# Patient Record
Sex: MALE | Race: WHITE | HISPANIC OR LATINO | URBAN - METROPOLITAN AREA
[De-identification: names, ages, dates, MRNs, and addresses within clinical notes are randomized per-mention and may not be internally consistent; named-entity substitution may affect disease eponyms.]

---

## 2017-02-06 ENCOUNTER — EMERGENCY (EMERGENCY)
Facility: HOSPITAL | Age: 42
LOS: 0 days | Discharge: HOME | End: 2017-02-06
Attending: EMERGENCY MEDICINE | Admitting: EMERGENCY MEDICINE
Payer: MEDICAID

## 2017-02-06 VITALS
DIASTOLIC BLOOD PRESSURE: 80 MMHG | SYSTOLIC BLOOD PRESSURE: 150 MMHG | TEMPERATURE: 97 F | RESPIRATION RATE: 16 BRPM | WEIGHT: 179.9 LBS | OXYGEN SATURATION: 100 % | HEART RATE: 76 BPM

## 2017-02-06 VITALS
TEMPERATURE: 98 F | SYSTOLIC BLOOD PRESSURE: 129 MMHG | HEART RATE: 84 BPM | RESPIRATION RATE: 16 BRPM | OXYGEN SATURATION: 98 % | DIASTOLIC BLOOD PRESSURE: 84 MMHG

## 2017-02-06 DIAGNOSIS — R11.0 NAUSEA: ICD-10-CM

## 2017-02-06 DIAGNOSIS — R10.13 EPIGASTRIC PAIN: ICD-10-CM

## 2017-02-06 LAB
ALBUMIN SERPL ELPH-MCNC: 3.8 G/DL — SIGNIFICANT CHANGE UP (ref 3.3–5)
ALP SERPL-CCNC: 49 U/L — SIGNIFICANT CHANGE UP (ref 40–120)
ALT FLD-CCNC: 63 U/L — SIGNIFICANT CHANGE UP (ref 12–78)
ANION GAP SERPL CALC-SCNC: 10 MMOL/L — SIGNIFICANT CHANGE UP (ref 5–17)
AST SERPL-CCNC: 40 U/L — HIGH (ref 15–37)
BASOPHILS # BLD AUTO: 0.1 K/UL — SIGNIFICANT CHANGE UP (ref 0–0.2)
BASOPHILS NFR BLD AUTO: 1.2 % — SIGNIFICANT CHANGE UP (ref 0–2)
BILIRUB SERPL-MCNC: 0.4 MG/DL — SIGNIFICANT CHANGE UP (ref 0.2–1.2)
BUN SERPL-MCNC: 13 MG/DL — SIGNIFICANT CHANGE UP (ref 7–23)
CALCIUM SERPL-MCNC: 8.2 MG/DL — LOW (ref 8.5–10.1)
CHLORIDE SERPL-SCNC: 102 MMOL/L — SIGNIFICANT CHANGE UP (ref 96–108)
CO2 SERPL-SCNC: 27 MMOL/L — SIGNIFICANT CHANGE UP (ref 22–31)
CREAT SERPL-MCNC: 0.9 MG/DL — SIGNIFICANT CHANGE UP (ref 0.5–1.3)
EOSINOPHIL # BLD AUTO: 0.3 K/UL — SIGNIFICANT CHANGE UP (ref 0–0.5)
EOSINOPHIL NFR BLD AUTO: 2.4 % — SIGNIFICANT CHANGE UP (ref 0–6)
GLUCOSE SERPL-MCNC: 156 MG/DL — HIGH (ref 70–99)
HCT VFR BLD CALC: 41.4 % — SIGNIFICANT CHANGE UP (ref 39–50)
HGB BLD-MCNC: 13.8 G/DL — SIGNIFICANT CHANGE UP (ref 13–17)
LIDOCAIN IGE QN: 172 U/L — SIGNIFICANT CHANGE UP (ref 73–393)
LYMPHOCYTES # BLD AUTO: 39.8 % — SIGNIFICANT CHANGE UP (ref 13–44)
LYMPHOCYTES # BLD AUTO: 4.9 K/UL — HIGH (ref 1–3.3)
MCHC RBC-ENTMCNC: 29 PG — SIGNIFICANT CHANGE UP (ref 27–34)
MCHC RBC-ENTMCNC: 33.3 GM/DL — SIGNIFICANT CHANGE UP (ref 32–36)
MCV RBC AUTO: 87.3 FL — SIGNIFICANT CHANGE UP (ref 80–100)
MONOCYTES # BLD AUTO: 1 K/UL — HIGH (ref 0–0.9)
MONOCYTES NFR BLD AUTO: 8.3 % — SIGNIFICANT CHANGE UP (ref 2–14)
NEUTROPHILS # BLD AUTO: 6 K/UL — SIGNIFICANT CHANGE UP (ref 1.8–7.4)
NEUTROPHILS NFR BLD AUTO: 48.3 % — SIGNIFICANT CHANGE UP (ref 43–77)
PLATELET # BLD AUTO: 280 K/UL — SIGNIFICANT CHANGE UP (ref 150–400)
POTASSIUM SERPL-MCNC: 3.6 MMOL/L — SIGNIFICANT CHANGE UP (ref 3.5–5.3)
POTASSIUM SERPL-SCNC: 3.6 MMOL/L — SIGNIFICANT CHANGE UP (ref 3.5–5.3)
PROT SERPL-MCNC: 7.5 GM/DL — SIGNIFICANT CHANGE UP (ref 6–8.3)
RBC # BLD: 4.74 M/UL — SIGNIFICANT CHANGE UP (ref 4.2–5.8)
RBC # FLD: 11.8 % — SIGNIFICANT CHANGE UP (ref 10.3–14.5)
SODIUM SERPL-SCNC: 139 MMOL/L — SIGNIFICANT CHANGE UP (ref 135–145)
WBC # BLD: 12.4 K/UL — HIGH (ref 3.8–10.5)
WBC # FLD AUTO: 12.4 K/UL — HIGH (ref 3.8–10.5)

## 2017-02-06 PROCEDURE — 99284 EMERGENCY DEPT VISIT MOD MDM: CPT | Mod: 25

## 2017-02-06 PROCEDURE — 93010 ELECTROCARDIOGRAM REPORT: CPT

## 2017-02-06 RX ORDER — FAMOTIDINE 10 MG/ML
20 INJECTION INTRAVENOUS ONCE
Qty: 0 | Refills: 0 | Status: DISCONTINUED | OUTPATIENT
Start: 2017-02-06 | End: 2017-02-06

## 2017-02-06 RX ORDER — SODIUM CHLORIDE 9 MG/ML
3 INJECTION INTRAMUSCULAR; INTRAVENOUS; SUBCUTANEOUS ONCE
Qty: 0 | Refills: 0 | Status: DISCONTINUED | OUTPATIENT
Start: 2017-02-06 | End: 2017-02-06

## 2017-02-06 RX ORDER — ONDANSETRON 8 MG/1
4 TABLET, FILM COATED ORAL ONCE
Qty: 0 | Refills: 0 | Status: COMPLETED | OUTPATIENT
Start: 2017-02-06 | End: 2017-02-06

## 2017-02-06 RX ORDER — FAMOTIDINE 10 MG/ML
20 INJECTION INTRAVENOUS ONCE
Qty: 0 | Refills: 0 | Status: COMPLETED | OUTPATIENT
Start: 2017-02-06 | End: 2017-02-06

## 2017-02-06 RX ORDER — SODIUM CHLORIDE 9 MG/ML
1000 INJECTION INTRAMUSCULAR; INTRAVENOUS; SUBCUTANEOUS ONCE
Qty: 0 | Refills: 0 | Status: DISCONTINUED | OUTPATIENT
Start: 2017-02-06 | End: 2017-02-06

## 2017-02-06 RX ORDER — ONDANSETRON 8 MG/1
4 TABLET, FILM COATED ORAL ONCE
Qty: 0 | Refills: 0 | Status: DISCONTINUED | OUTPATIENT
Start: 2017-02-06 | End: 2017-02-06

## 2017-02-06 RX ADMIN — ONDANSETRON 4 MILLIGRAM(S): 8 TABLET, FILM COATED ORAL at 07:46

## 2017-02-06 RX ADMIN — FAMOTIDINE 20 MILLIGRAM(S): 10 INJECTION INTRAVENOUS at 07:47

## 2017-02-06 NOTE — ED PROVIDER NOTE - OBJECTIVE STATEMENT
Pt. is a 41 year old male no significant medical history presents with epigastric pain x 5 hours.  Denies V/D but states nausea.  Denies recent ETOH use.  Neg. F/C/S.  Neg. medications take PTA.

## 2017-02-06 NOTE — ED PROVIDER NOTE - ATTENDING CONTRIBUTION TO CARE
42 yo male c/o few hours of upper abdominal pain s/p eating spicy food. +nausea. no vomiting or diarrhea.   no chest pain or shortness of breath  agree with above

## 2017-02-06 NOTE — ED ADULT TRIAGE NOTE - CHIEF COMPLAINT QUOTE
Abdominal pain, nausea Abdominal pain, nausea x 3 hours Abdominal pain, nausea x 3 hours, states it began after eating spicy food

## 2017-04-07 ENCOUNTER — EMERGENCY (EMERGENCY)
Facility: HOSPITAL | Age: 42
LOS: 0 days | Discharge: HOME | End: 2017-04-07
Attending: EMERGENCY MEDICINE | Admitting: EMERGENCY MEDICINE
Payer: MEDICAID

## 2017-04-07 VITALS
TEMPERATURE: 98 F | WEIGHT: 179.9 LBS | HEART RATE: 62 BPM | SYSTOLIC BLOOD PRESSURE: 151 MMHG | OXYGEN SATURATION: 100 % | RESPIRATION RATE: 18 BRPM | HEIGHT: 68 IN | DIASTOLIC BLOOD PRESSURE: 98 MMHG

## 2017-04-07 VITALS — HEART RATE: 69 BPM | RESPIRATION RATE: 18 BRPM | DIASTOLIC BLOOD PRESSURE: 85 MMHG | SYSTOLIC BLOOD PRESSURE: 127 MMHG

## 2017-04-07 DIAGNOSIS — K80.20 CALCULUS OF GALLBLADDER WITHOUT CHOLECYSTITIS WITHOUT OBSTRUCTION: ICD-10-CM

## 2017-04-07 DIAGNOSIS — R10.11 RIGHT UPPER QUADRANT PAIN: ICD-10-CM

## 2017-04-07 LAB
ALBUMIN SERPL ELPH-MCNC: 3.9 G/DL — SIGNIFICANT CHANGE UP (ref 3.3–5)
ALP SERPL-CCNC: 59 U/L — SIGNIFICANT CHANGE UP (ref 40–120)
ALT FLD-CCNC: 83 U/L — HIGH (ref 12–78)
ANION GAP SERPL CALC-SCNC: 11 MMOL/L — SIGNIFICANT CHANGE UP (ref 5–17)
APPEARANCE UR: CLEAR — SIGNIFICANT CHANGE UP
AST SERPL-CCNC: 41 U/L — HIGH (ref 15–37)
BACTERIA # UR AUTO: NEGATIVE — SIGNIFICANT CHANGE UP
BASOPHILS # BLD AUTO: 0.2 K/UL — SIGNIFICANT CHANGE UP (ref 0–0.2)
BASOPHILS NFR BLD AUTO: 1 % — SIGNIFICANT CHANGE UP (ref 0–2)
BILIRUB SERPL-MCNC: 0.4 MG/DL — SIGNIFICANT CHANGE UP (ref 0.2–1.2)
BILIRUB UR-MCNC: NEGATIVE — SIGNIFICANT CHANGE UP
BUN SERPL-MCNC: 16 MG/DL — SIGNIFICANT CHANGE UP (ref 7–23)
CALCIUM SERPL-MCNC: 8.9 MG/DL — SIGNIFICANT CHANGE UP (ref 8.5–10.1)
CHLORIDE SERPL-SCNC: 104 MMOL/L — SIGNIFICANT CHANGE UP (ref 96–108)
CO2 SERPL-SCNC: 24 MMOL/L — SIGNIFICANT CHANGE UP (ref 22–31)
COLOR SPEC: YELLOW — SIGNIFICANT CHANGE UP
CREAT SERPL-MCNC: 0.88 MG/DL — SIGNIFICANT CHANGE UP (ref 0.5–1.3)
DIFF PNL FLD: NEGATIVE — SIGNIFICANT CHANGE UP
EOSINOPHIL # BLD AUTO: 0.3 K/UL — SIGNIFICANT CHANGE UP (ref 0–0.5)
EOSINOPHIL NFR BLD AUTO: 1.6 % — SIGNIFICANT CHANGE UP (ref 0–6)
EPI CELLS # UR: NEGATIVE — SIGNIFICANT CHANGE UP
GLUCOSE SERPL-MCNC: 180 MG/DL — HIGH (ref 70–99)
GLUCOSE UR QL: 50 MG/DL
HCT VFR BLD CALC: 45.4 % — SIGNIFICANT CHANGE UP (ref 39–50)
HGB BLD-MCNC: 15.4 G/DL — SIGNIFICANT CHANGE UP (ref 13–17)
KETONES UR-MCNC: NEGATIVE — SIGNIFICANT CHANGE UP
LEUKOCYTE ESTERASE UR-ACNC: NEGATIVE — SIGNIFICANT CHANGE UP
LIDOCAIN IGE QN: 220 U/L — SIGNIFICANT CHANGE UP (ref 73–393)
LYMPHOCYTES # BLD AUTO: 27.9 % — SIGNIFICANT CHANGE UP (ref 13–44)
LYMPHOCYTES # BLD AUTO: 4.9 K/UL — HIGH (ref 1–3.3)
MCHC RBC-ENTMCNC: 30 PG — SIGNIFICANT CHANGE UP (ref 27–34)
MCHC RBC-ENTMCNC: 34 GM/DL — SIGNIFICANT CHANGE UP (ref 32–36)
MCV RBC AUTO: 88.2 FL — SIGNIFICANT CHANGE UP (ref 80–100)
MONOCYTES # BLD AUTO: 1.3 K/UL — HIGH (ref 0–0.9)
MONOCYTES NFR BLD AUTO: 7.5 % — SIGNIFICANT CHANGE UP (ref 2–14)
NEUTROPHILS # BLD AUTO: 10.8 K/UL — HIGH (ref 1.8–7.4)
NEUTROPHILS NFR BLD AUTO: 62 % — SIGNIFICANT CHANGE UP (ref 43–77)
NITRITE UR-MCNC: NEGATIVE — SIGNIFICANT CHANGE UP
PH UR: 6 — SIGNIFICANT CHANGE UP (ref 4.8–8)
PLATELET # BLD AUTO: 283 K/UL — SIGNIFICANT CHANGE UP (ref 150–400)
POTASSIUM SERPL-MCNC: 3.6 MMOL/L — SIGNIFICANT CHANGE UP (ref 3.5–5.3)
POTASSIUM SERPL-SCNC: 3.6 MMOL/L — SIGNIFICANT CHANGE UP (ref 3.5–5.3)
PROT SERPL-MCNC: 7.7 GM/DL — SIGNIFICANT CHANGE UP (ref 6–8.3)
PROT UR-MCNC: 15 MG/DL
RBC # BLD: 5.15 M/UL — SIGNIFICANT CHANGE UP (ref 4.2–5.8)
RBC # FLD: 12.7 % — SIGNIFICANT CHANGE UP (ref 10.3–14.5)
RBC CASTS # UR COMP ASSIST: NEGATIVE /HPF — SIGNIFICANT CHANGE UP (ref 0–4)
SODIUM SERPL-SCNC: 139 MMOL/L — SIGNIFICANT CHANGE UP (ref 135–145)
SP GR SPEC: 1.02 — SIGNIFICANT CHANGE UP (ref 1.01–1.02)
UROBILINOGEN FLD QL: NEGATIVE MG/DL — SIGNIFICANT CHANGE UP
WBC # BLD: 17.5 K/UL — HIGH (ref 3.8–10.5)
WBC # FLD AUTO: 17.5 K/UL — HIGH (ref 3.8–10.5)
WBC UR QL: SIGNIFICANT CHANGE UP

## 2017-04-07 PROCEDURE — 99284 EMERGENCY DEPT VISIT MOD MDM: CPT | Mod: 25

## 2017-04-07 PROCEDURE — 76705 ECHO EXAM OF ABDOMEN: CPT | Mod: 26

## 2017-04-07 PROCEDURE — 78226 HEPATOBILIARY SYSTEM IMAGING: CPT | Mod: 26

## 2017-04-07 RX ORDER — SODIUM CHLORIDE 9 MG/ML
1000 INJECTION INTRAMUSCULAR; INTRAVENOUS; SUBCUTANEOUS ONCE
Qty: 0 | Refills: 0 | Status: COMPLETED | OUTPATIENT
Start: 2017-04-07 | End: 2017-04-07

## 2017-04-07 RX ORDER — ONDANSETRON 8 MG/1
4 TABLET, FILM COATED ORAL ONCE
Qty: 0 | Refills: 0 | Status: COMPLETED | OUTPATIENT
Start: 2017-04-07 | End: 2017-04-07

## 2017-04-07 RX ORDER — MORPHINE SULFATE 50 MG/1
4 CAPSULE, EXTENDED RELEASE ORAL ONCE
Qty: 0 | Refills: 0 | Status: DISCONTINUED | OUTPATIENT
Start: 2017-04-07 | End: 2017-04-07

## 2017-04-07 RX ORDER — PIPERACILLIN AND TAZOBACTAM 4; .5 G/20ML; G/20ML
3.38 INJECTION, POWDER, LYOPHILIZED, FOR SOLUTION INTRAVENOUS ONCE
Qty: 0 | Refills: 0 | Status: COMPLETED | OUTPATIENT
Start: 2017-04-07 | End: 2017-04-07

## 2017-04-07 RX ORDER — TRAMADOL HYDROCHLORIDE 50 MG/1
1 TABLET ORAL
Qty: 16 | Refills: 0 | OUTPATIENT
Start: 2017-04-07 | End: 2017-04-11

## 2017-04-07 RX ADMIN — ONDANSETRON 4 MILLIGRAM(S): 8 TABLET, FILM COATED ORAL at 05:15

## 2017-04-07 RX ADMIN — MORPHINE SULFATE 4 MILLIGRAM(S): 50 CAPSULE, EXTENDED RELEASE ORAL at 05:14

## 2017-04-07 RX ADMIN — PIPERACILLIN AND TAZOBACTAM 200 GRAM(S): 4; .5 INJECTION, POWDER, LYOPHILIZED, FOR SOLUTION INTRAVENOUS at 07:27

## 2017-04-07 RX ADMIN — SODIUM CHLORIDE 1000 MILLILITER(S): 9 INJECTION INTRAMUSCULAR; INTRAVENOUS; SUBCUTANEOUS at 04:12

## 2017-04-07 NOTE — ED PROVIDER NOTE - NS ED MD SCRIBE ATTENDING SCRIBE SECTIONS
PROGRESS NOTE/RESULTS/OBSERVATION MONITORING PLAN/SCALES/PHYSICAL EXAM/DISPOSITION/INTAKE ASSESSMENT/SCREENINGS/VITAL SIGNS( Pullset)/PROVIDER CARE INITIATION/MANDATORY DOCUMENTATION/HIV/HISTORY OF PRESENT ILLNESS/CONSULTATIONS/SHIFT CHANGE/REVIEW OF SYSTEMS/PAST MEDICAL/SURGICAL/SOCIAL HISTORY

## 2017-04-07 NOTE — ED ADULT NURSE NOTE - OBJECTIVE STATEMENT
42 y/o male awake alert and oriented x4 presents to ED c/o RUQ pain started at 12:30am tonight associated with nausea and vomiting. pt vomited x2 times pta. pt was seen in the ED for similar sx. Denies cp,sob,ha,dz,fever/chills or urinary sx.

## 2017-04-07 NOTE — ED PROVIDER NOTE - OBJECTIVE STATEMENT
40 y/o male presents to ED with c/o abdominal pain. 40 y/o male presents to ED with c/o sudden abdominal pain began 4 hours ago while asleep.  Denies gallbladder hx. Denies surgical hx. Dr. John is PMD.

## 2017-04-07 NOTE — ED ADULT NURSE REASSESSMENT NOTE - NS ED NURSE REASSESS COMMENT FT1
received pt from Edwige RN, pt lying in bed comfortable, pt states 0 out of 10 on pain scale, vss, IV antibiotics running, pt needs and comfort care met

## 2017-04-07 NOTE — ED PROVIDER NOTE - DETAILS:
I, Aleksandar Hope, performed the initial face to face bedside interview with this patient regarding history of present illness, review of symptoms and relevant past medical, social and family history.  I completed an independent physical examination.  I was the initial provider who evaluated this patient.  The history, relevant review of systems, past medical and surgical history, medical decision making, and physical examination was documented by the scribe in my presence and I attest to the accuracy of the documentation.

## 2017-06-12 ENCOUNTER — OUTPATIENT (OUTPATIENT)
Dept: OUTPATIENT SERVICES | Facility: HOSPITAL | Age: 42
LOS: 1 days | End: 2017-06-12
Payer: SELF-PAY

## 2017-06-12 ENCOUNTER — APPOINTMENT (OUTPATIENT)
Dept: SURGERY | Facility: HOSPITAL | Age: 42
End: 2017-06-12

## 2017-06-12 VITALS
BODY MASS INDEX: 30.29 KG/M2 | SYSTOLIC BLOOD PRESSURE: 141 MMHG | RESPIRATION RATE: 14 BRPM | HEIGHT: 65.5 IN | HEART RATE: 64 BPM | WEIGHT: 184 LBS | DIASTOLIC BLOOD PRESSURE: 96 MMHG

## 2017-06-12 DIAGNOSIS — L72.3 SEBACEOUS CYST: ICD-10-CM

## 2017-06-12 DIAGNOSIS — R10.13 EPIGASTRIC PAIN: ICD-10-CM

## 2017-06-12 PROCEDURE — G0463: CPT

## 2017-07-10 ENCOUNTER — APPOINTMENT (OUTPATIENT)
Dept: SURGERY | Facility: HOSPITAL | Age: 42
End: 2017-07-10

## 2017-07-13 ENCOUNTER — OUTPATIENT (OUTPATIENT)
Dept: OUTPATIENT SERVICES | Facility: HOSPITAL | Age: 42
LOS: 1 days | Discharge: HOME | End: 2017-07-13
Payer: SUBSIDIZED

## 2017-07-13 ENCOUNTER — RESULT REVIEW (OUTPATIENT)
Age: 42
End: 2017-07-13

## 2017-07-13 VITALS
SYSTOLIC BLOOD PRESSURE: 156 MMHG | HEIGHT: 66 IN | TEMPERATURE: 97 F | HEART RATE: 75 BPM | WEIGHT: 179.9 LBS | DIASTOLIC BLOOD PRESSURE: 92 MMHG | OXYGEN SATURATION: 98 % | RESPIRATION RATE: 19 BRPM

## 2017-07-13 PROCEDURE — 88312 SPECIAL STAINS GROUP 1: CPT | Mod: 26

## 2017-07-13 PROCEDURE — 88342 IMHCHEM/IMCYTCHM 1ST ANTB: CPT | Mod: 26

## 2017-07-13 PROCEDURE — 88305 TISSUE EXAM BY PATHOLOGIST: CPT | Mod: 26

## 2017-07-13 NOTE — ASU PATIENT PROFILE, ADULT - LANGUAGE ASSISTANCE NEEDED
Yes-Patient/Caregiver accepts free interpretation services... No-Patient/Caregiver offered and refused free interpretation services.

## 2017-07-18 LAB — SURGICAL PATHOLOGY FINAL REPORT - CH: SIGNIFICANT CHANGE UP

## 2017-07-19 DIAGNOSIS — I10 ESSENTIAL (PRIMARY) HYPERTENSION: ICD-10-CM

## 2017-07-19 DIAGNOSIS — R10.13 EPIGASTRIC PAIN: ICD-10-CM

## 2017-07-19 DIAGNOSIS — K29.50 UNSPECIFIED CHRONIC GASTRITIS WITHOUT BLEEDING: ICD-10-CM

## 2017-08-17 ENCOUNTER — EMERGENCY (EMERGENCY)
Facility: HOSPITAL | Age: 42
LOS: 0 days | Discharge: HOME | End: 2017-08-17
Attending: EMERGENCY MEDICINE | Admitting: EMERGENCY MEDICINE
Payer: MEDICAID

## 2017-08-17 VITALS
WEIGHT: 179.9 LBS | DIASTOLIC BLOOD PRESSURE: 111 MMHG | HEIGHT: 66 IN | OXYGEN SATURATION: 100 % | RESPIRATION RATE: 18 BRPM | TEMPERATURE: 98 F | SYSTOLIC BLOOD PRESSURE: 176 MMHG | HEART RATE: 77 BPM

## 2017-08-17 VITALS
OXYGEN SATURATION: 99 % | HEART RATE: 78 BPM | DIASTOLIC BLOOD PRESSURE: 78 MMHG | SYSTOLIC BLOOD PRESSURE: 123 MMHG | RESPIRATION RATE: 18 BRPM | TEMPERATURE: 98 F

## 2017-08-17 DIAGNOSIS — R10.13 EPIGASTRIC PAIN: ICD-10-CM

## 2017-08-17 LAB
ALBUMIN SERPL ELPH-MCNC: 4 G/DL — SIGNIFICANT CHANGE UP (ref 3.3–5)
ALP SERPL-CCNC: 58 U/L — SIGNIFICANT CHANGE UP (ref 40–120)
ALT FLD-CCNC: 30 U/L — SIGNIFICANT CHANGE UP (ref 12–78)
ANION GAP SERPL CALC-SCNC: 8 MMOL/L — SIGNIFICANT CHANGE UP (ref 5–17)
AST SERPL-CCNC: 21 U/L — SIGNIFICANT CHANGE UP (ref 15–37)
BASOPHILS # BLD AUTO: 0.1 K/UL — SIGNIFICANT CHANGE UP (ref 0–0.2)
BASOPHILS NFR BLD AUTO: 0.9 % — SIGNIFICANT CHANGE UP (ref 0–2)
BILIRUB SERPL-MCNC: 0.3 MG/DL — SIGNIFICANT CHANGE UP (ref 0.2–1.2)
BUN SERPL-MCNC: 15 MG/DL — SIGNIFICANT CHANGE UP (ref 7–23)
CALCIUM SERPL-MCNC: 8.7 MG/DL — SIGNIFICANT CHANGE UP (ref 8.5–10.1)
CHLORIDE SERPL-SCNC: 105 MMOL/L — SIGNIFICANT CHANGE UP (ref 96–108)
CO2 SERPL-SCNC: 26 MMOL/L — SIGNIFICANT CHANGE UP (ref 22–31)
CREAT SERPL-MCNC: 0.77 MG/DL — SIGNIFICANT CHANGE UP (ref 0.5–1.3)
EOSINOPHIL # BLD AUTO: 0.3 K/UL — SIGNIFICANT CHANGE UP (ref 0–0.5)
EOSINOPHIL NFR BLD AUTO: 2 % — SIGNIFICANT CHANGE UP (ref 0–6)
GLUCOSE SERPL-MCNC: 158 MG/DL — HIGH (ref 70–99)
HCT VFR BLD CALC: 44.3 % — SIGNIFICANT CHANGE UP (ref 39–50)
HGB BLD-MCNC: 15.2 G/DL — SIGNIFICANT CHANGE UP (ref 13–17)
LIDOCAIN IGE QN: 178 U/L — SIGNIFICANT CHANGE UP (ref 73–393)
LYMPHOCYTES # BLD AUTO: 26.6 % — SIGNIFICANT CHANGE UP (ref 13–44)
LYMPHOCYTES # BLD AUTO: 4.3 K/UL — HIGH (ref 1–3.3)
MAGNESIUM SERPL-MCNC: 2 MG/DL — SIGNIFICANT CHANGE UP (ref 1.6–2.6)
MCHC RBC-ENTMCNC: 29.2 PG — SIGNIFICANT CHANGE UP (ref 27–34)
MCHC RBC-ENTMCNC: 34.2 GM/DL — SIGNIFICANT CHANGE UP (ref 32–36)
MCV RBC AUTO: 85.5 FL — SIGNIFICANT CHANGE UP (ref 80–100)
MONOCYTES # BLD AUTO: 1.2 K/UL — HIGH (ref 0–0.9)
MONOCYTES NFR BLD AUTO: 7.4 % — SIGNIFICANT CHANGE UP (ref 2–14)
NEUTROPHILS # BLD AUTO: 10.2 K/UL — HIGH (ref 1.8–7.4)
NEUTROPHILS NFR BLD AUTO: 63.2 % — SIGNIFICANT CHANGE UP (ref 43–77)
PLATELET # BLD AUTO: 224 K/UL — SIGNIFICANT CHANGE UP (ref 150–400)
POTASSIUM SERPL-MCNC: 3.4 MMOL/L — LOW (ref 3.5–5.3)
POTASSIUM SERPL-SCNC: 3.4 MMOL/L — LOW (ref 3.5–5.3)
PROT SERPL-MCNC: 7.8 GM/DL — SIGNIFICANT CHANGE UP (ref 6–8.3)
RBC # BLD: 5.19 M/UL — SIGNIFICANT CHANGE UP (ref 4.2–5.8)
RBC # FLD: 11.9 % — SIGNIFICANT CHANGE UP (ref 10.3–14.5)
SODIUM SERPL-SCNC: 139 MMOL/L — SIGNIFICANT CHANGE UP (ref 135–145)
WBC # BLD: 16.2 K/UL — HIGH (ref 3.8–10.5)
WBC # FLD AUTO: 16.2 K/UL — HIGH (ref 3.8–10.5)

## 2017-08-17 PROCEDURE — 76705 ECHO EXAM OF ABDOMEN: CPT | Mod: 26

## 2017-08-17 PROCEDURE — 93010 ELECTROCARDIOGRAM REPORT: CPT

## 2017-08-17 PROCEDURE — 99285 EMERGENCY DEPT VISIT HI MDM: CPT

## 2017-08-17 RX ORDER — KETOROLAC TROMETHAMINE 30 MG/ML
30 SYRINGE (ML) INJECTION ONCE
Qty: 0 | Refills: 0 | Status: DISCONTINUED | OUTPATIENT
Start: 2017-08-17 | End: 2017-08-17

## 2017-08-17 RX ORDER — FAMOTIDINE 10 MG/ML
20 INJECTION INTRAVENOUS ONCE
Qty: 0 | Refills: 0 | Status: COMPLETED | OUTPATIENT
Start: 2017-08-17 | End: 2017-08-17

## 2017-08-17 RX ORDER — POTASSIUM CHLORIDE 20 MEQ
20 PACKET (EA) ORAL ONCE
Qty: 0 | Refills: 0 | Status: COMPLETED | OUTPATIENT
Start: 2017-08-17 | End: 2017-08-17

## 2017-08-17 RX ORDER — ONDANSETRON 8 MG/1
4 TABLET, FILM COATED ORAL ONCE
Qty: 0 | Refills: 0 | Status: COMPLETED | OUTPATIENT
Start: 2017-08-17 | End: 2017-08-17

## 2017-08-17 RX ORDER — TRAMADOL HYDROCHLORIDE 50 MG/1
1 TABLET ORAL
Qty: 20 | Refills: 0
Start: 2017-08-17

## 2017-08-17 RX ORDER — SODIUM CHLORIDE 9 MG/ML
2000 INJECTION INTRAMUSCULAR; INTRAVENOUS; SUBCUTANEOUS ONCE
Qty: 0 | Refills: 0 | Status: COMPLETED | OUTPATIENT
Start: 2017-08-17 | End: 2017-08-17

## 2017-08-17 RX ADMIN — Medication 30 MILLIGRAM(S): at 02:10

## 2017-08-17 RX ADMIN — Medication 30 MILLIGRAM(S): at 01:53

## 2017-08-17 RX ADMIN — ONDANSETRON 4 MILLIGRAM(S): 8 TABLET, FILM COATED ORAL at 01:30

## 2017-08-17 RX ADMIN — Medication 20 MILLIEQUIVALENT(S): at 02:13

## 2017-08-17 RX ADMIN — FAMOTIDINE 20 MILLIGRAM(S): 10 INJECTION INTRAVENOUS at 01:30

## 2017-08-17 RX ADMIN — SODIUM CHLORIDE 2000 MILLILITER(S): 9 INJECTION INTRAMUSCULAR; INTRAVENOUS; SUBCUTANEOUS at 01:10

## 2017-08-17 NOTE — ED PROVIDER NOTE - MEDICAL DECISION MAKING DETAILS
43 yo male with recurrent episodes of n/v and epigastric pain, will check labs, hydrate and pain control

## 2017-08-17 NOTE — ED ADULT NURSE NOTE - OBJECTIVE STATEMENT
Pt states he has had RUQ ABD pain since approx 7pm today with associated N/V.  Pt states MD told him he has "stones in gallbladder".

## 2017-08-17 NOTE — ED ADULT NURSE REASSESSMENT NOTE - NS ED NURSE REASSESS COMMENT FT1
Pt in stretcher. Asleep but arousable to verbal stimuli. A&Ox3. MAEx4. Breathing spontaneously on RA. VSS. Denies abd pain or N&V at this time. All needs are met and safety maintained. Will continue to monitor.

## 2017-08-17 NOTE — ED ADULT TRIAGE NOTE - CHIEF COMPLAINT QUOTE
acute onset of epigastric pain associated with nausea and vomiting. Denies diarrhea, fever/chills or urinary sx.

## 2017-08-17 NOTE — ED PROVIDER NOTE - GASTROINTESTINAL, MLM
Abdomen soft,  mildly tender epigastric region without rebound or gaurding Abdomen soft,  mildly tender epigastric region without rebound or guarding

## 2017-08-17 NOTE — ED PROVIDER NOTE - OBJECTIVE STATEMENT
41 yo male c/o abdominal pain.  Pt states pain started around 6pm, lightly 43 yo male c/o abdominal pain.  Pt states pain started around 6pm, lightly at first, then patient ate papusa and milk and the pain became more severe.  Pt has had this similar pain on and off for over a year with extensive w/u.  Pt was given a Rx for Tramadol, which generally relieves this pain, but he had run out.  +nausea and vomiting, no diarrhea, no fever.  No past surgical hx.  Does not drink or smoke, denies drug use.

## 2017-08-17 NOTE — ED PROVIDER NOTE - PROGRESS NOTE DETAILS
Previous records reviewed,  pt with multiple visits to ED and to clinic.  Pt had w/u in April 2017 with US showing GB sludge and neg HIDA scan.  Endoscopy done 7/13/17 showed mild chronic inflammation and congestion.

## 2017-08-22 ENCOUNTER — OUTPATIENT (OUTPATIENT)
Dept: OUTPATIENT SERVICES | Facility: HOSPITAL | Age: 42
LOS: 1 days | End: 2017-08-22
Payer: SELF-PAY

## 2017-08-22 ENCOUNTER — APPOINTMENT (OUTPATIENT)
Dept: GASTROENTEROLOGY | Facility: HOSPITAL | Age: 42
End: 2017-08-22

## 2017-08-22 VITALS
HEIGHT: 65.5 IN | WEIGHT: 177 LBS | BODY MASS INDEX: 29.14 KG/M2 | HEART RATE: 68 BPM | DIASTOLIC BLOOD PRESSURE: 82 MMHG | SYSTOLIC BLOOD PRESSURE: 121 MMHG | RESPIRATION RATE: 14 BRPM

## 2017-08-22 DIAGNOSIS — I10 ESSENTIAL (PRIMARY) HYPERTENSION: ICD-10-CM

## 2017-08-22 DIAGNOSIS — R10.13 EPIGASTRIC PAIN: ICD-10-CM

## 2017-08-22 DIAGNOSIS — K31.9 DISEASE OF STOMACH AND DUODENUM, UNSPECIFIED: ICD-10-CM

## 2017-08-22 DIAGNOSIS — Z80.0 FAMILY HISTORY OF MALIGNANT NEOPLASM OF DIGESTIVE ORGANS: ICD-10-CM

## 2017-08-22 DIAGNOSIS — G89.29 EPIGASTRIC PAIN: ICD-10-CM

## 2017-08-22 DIAGNOSIS — Z86.79 PERSONAL HISTORY OF OTHER DISEASES OF THE CIRCULATORY SYSTEM: ICD-10-CM

## 2017-08-22 LAB
ALBUMIN SERPL ELPH-MCNC: 4.5 G/DL — SIGNIFICANT CHANGE UP (ref 3.3–5)
ALP SERPL-CCNC: 52 U/L — SIGNIFICANT CHANGE UP (ref 40–120)
ALT FLD-CCNC: 23 U/L — SIGNIFICANT CHANGE UP (ref 10–45)
ANION GAP SERPL CALC-SCNC: 14 MMOL/L — SIGNIFICANT CHANGE UP (ref 5–17)
AST SERPL-CCNC: 27 U/L — SIGNIFICANT CHANGE UP (ref 10–40)
BASOPHILS # BLD AUTO: 0.05 K/UL — SIGNIFICANT CHANGE UP (ref 0–0.2)
BASOPHILS NFR BLD AUTO: 0.5 % — SIGNIFICANT CHANGE UP (ref 0–2)
BILIRUB SERPL-MCNC: 0.7 MG/DL — SIGNIFICANT CHANGE UP (ref 0.2–1.2)
BUN SERPL-MCNC: 14 MG/DL — SIGNIFICANT CHANGE UP (ref 7–23)
CALCIUM SERPL-MCNC: 9 MG/DL — SIGNIFICANT CHANGE UP (ref 8.4–10.5)
CHLORIDE SERPL-SCNC: 101 MMOL/L — SIGNIFICANT CHANGE UP (ref 96–108)
CO2 SERPL-SCNC: 27 MMOL/L — SIGNIFICANT CHANGE UP (ref 22–31)
CREAT SERPL-MCNC: 0.99 MG/DL — SIGNIFICANT CHANGE UP (ref 0.5–1.3)
EOSINOPHIL # BLD AUTO: 0.31 K/UL — SIGNIFICANT CHANGE UP (ref 0–0.5)
EOSINOPHIL NFR BLD AUTO: 3.1 % — SIGNIFICANT CHANGE UP (ref 0–6)
GLUCOSE SERPL-MCNC: 69 MG/DL — LOW (ref 70–99)
HCT VFR BLD CALC: 45.5 % — SIGNIFICANT CHANGE UP (ref 39–50)
HGB BLD-MCNC: 15 G/DL — SIGNIFICANT CHANGE UP (ref 13–17)
IMM GRANULOCYTES NFR BLD AUTO: 0.3 % — SIGNIFICANT CHANGE UP (ref 0–1.5)
LYMPHOCYTES # BLD AUTO: 4.19 K/UL — HIGH (ref 1–3.3)
LYMPHOCYTES # BLD AUTO: 42.5 % — SIGNIFICANT CHANGE UP (ref 13–44)
MCHC RBC-ENTMCNC: 28.9 PG — SIGNIFICANT CHANGE UP (ref 27–34)
MCHC RBC-ENTMCNC: 33 GM/DL — SIGNIFICANT CHANGE UP (ref 32–36)
MCV RBC AUTO: 87.7 FL — SIGNIFICANT CHANGE UP (ref 80–100)
MONOCYTES # BLD AUTO: 0.95 K/UL — HIGH (ref 0–0.9)
MONOCYTES NFR BLD AUTO: 9.6 % — SIGNIFICANT CHANGE UP (ref 2–14)
NEUTROPHILS # BLD AUTO: 4.33 K/UL — SIGNIFICANT CHANGE UP (ref 1.8–7.4)
NEUTROPHILS NFR BLD AUTO: 44 % — SIGNIFICANT CHANGE UP (ref 43–77)
PLATELET # BLD AUTO: 258 K/UL — SIGNIFICANT CHANGE UP (ref 150–400)
POTASSIUM SERPL-MCNC: 4.3 MMOL/L — SIGNIFICANT CHANGE UP (ref 3.5–5.3)
POTASSIUM SERPL-SCNC: 4.3 MMOL/L — SIGNIFICANT CHANGE UP (ref 3.5–5.3)
PROT SERPL-MCNC: 8.1 G/DL — SIGNIFICANT CHANGE UP (ref 6–8.3)
RBC # BLD: 5.19 M/UL — SIGNIFICANT CHANGE UP (ref 4.2–5.8)
RBC # FLD: 13 % — SIGNIFICANT CHANGE UP (ref 10.3–14.5)
SODIUM SERPL-SCNC: 142 MMOL/L — SIGNIFICANT CHANGE UP (ref 135–145)
WBC # BLD: 9.86 K/UL — SIGNIFICANT CHANGE UP (ref 3.8–10.5)
WBC # FLD AUTO: 9.86 K/UL — SIGNIFICANT CHANGE UP (ref 3.8–10.5)

## 2017-08-22 PROCEDURE — 85027 COMPLETE CBC AUTOMATED: CPT

## 2017-08-22 PROCEDURE — G0463: CPT

## 2017-08-22 PROCEDURE — 36415 COLL VENOUS BLD VENIPUNCTURE: CPT

## 2017-08-22 PROCEDURE — 80053 COMPREHEN METABOLIC PANEL: CPT

## 2017-08-22 RX ORDER — HYDROCHLOROTHIAZIDE 12.5 MG/1
12.5 CAPSULE ORAL
Refills: 0 | Status: ACTIVE | COMMUNITY

## 2017-09-20 ENCOUNTER — RESULT REVIEW (OUTPATIENT)
Age: 42
End: 2017-09-20

## 2017-09-20 ENCOUNTER — OUTPATIENT (OUTPATIENT)
Dept: OUTPATIENT SERVICES | Facility: HOSPITAL | Age: 42
LOS: 1 days | End: 2017-09-20
Payer: SELF-PAY

## 2017-09-20 DIAGNOSIS — R10.13 EPIGASTRIC PAIN: ICD-10-CM

## 2017-09-20 PROCEDURE — 88312 SPECIAL STAINS GROUP 1: CPT

## 2017-09-20 PROCEDURE — 88305 TISSUE EXAM BY PATHOLOGIST: CPT

## 2017-09-20 PROCEDURE — 88312 SPECIAL STAINS GROUP 1: CPT | Mod: 26

## 2017-09-20 PROCEDURE — 88305 TISSUE EXAM BY PATHOLOGIST: CPT | Mod: 26

## 2017-09-20 PROCEDURE — 43239 EGD BIOPSY SINGLE/MULTIPLE: CPT

## 2017-09-21 LAB — SURGICAL PATHOLOGY STUDY: SIGNIFICANT CHANGE UP

## 2017-11-21 ENCOUNTER — OUTPATIENT (OUTPATIENT)
Dept: OUTPATIENT SERVICES | Facility: HOSPITAL | Age: 42
LOS: 1 days | End: 2017-11-21
Payer: SELF-PAY

## 2017-11-21 ENCOUNTER — APPOINTMENT (OUTPATIENT)
Dept: GASTROENTEROLOGY | Facility: HOSPITAL | Age: 42
End: 2017-11-21

## 2017-11-21 VITALS
HEART RATE: 76 BPM | BODY MASS INDEX: 29.49 KG/M2 | WEIGHT: 177 LBS | SYSTOLIC BLOOD PRESSURE: 127 MMHG | DIASTOLIC BLOOD PRESSURE: 88 MMHG | HEIGHT: 65 IN | RESPIRATION RATE: 14 BRPM

## 2017-11-21 DIAGNOSIS — K31.9 DISEASE OF STOMACH AND DUODENUM, UNSPECIFIED: ICD-10-CM

## 2017-11-21 DIAGNOSIS — R10.13 EPIGASTRIC PAIN: ICD-10-CM

## 2017-11-21 PROCEDURE — G0463: CPT

## 2018-02-13 ENCOUNTER — APPOINTMENT (OUTPATIENT)
Dept: GASTROENTEROLOGY | Facility: HOSPITAL | Age: 43
End: 2018-02-13

## 2018-02-13 ENCOUNTER — OUTPATIENT (OUTPATIENT)
Dept: OUTPATIENT SERVICES | Facility: HOSPITAL | Age: 43
LOS: 1 days | End: 2018-02-13
Payer: SELF-PAY

## 2018-02-13 VITALS
DIASTOLIC BLOOD PRESSURE: 93 MMHG | HEART RATE: 72 BPM | WEIGHT: 185 LBS | RESPIRATION RATE: 16 BRPM | SYSTOLIC BLOOD PRESSURE: 146 MMHG | BODY MASS INDEX: 30.82 KG/M2 | HEIGHT: 65 IN

## 2018-02-13 DIAGNOSIS — R10.13 EPIGASTRIC PAIN: ICD-10-CM

## 2018-02-13 PROCEDURE — G0463: CPT

## 2018-02-13 RX ORDER — OMEPRAZOLE 40 MG/1
40 CAPSULE, DELAYED RELEASE ORAL DAILY
Qty: 30 | Refills: 3 | Status: ACTIVE | COMMUNITY
Start: 2018-02-13 | End: 1900-01-01

## 2018-02-14 PROBLEM — R10.13 EPIGASTRIC PAIN: Status: ACTIVE | Noted: 2017-08-22

## 2018-02-26 ENCOUNTER — APPOINTMENT (OUTPATIENT)
Dept: SURGERY | Facility: HOSPITAL | Age: 43
End: 2018-02-26

## 2018-02-26 ENCOUNTER — OUTPATIENT (OUTPATIENT)
Dept: OUTPATIENT SERVICES | Facility: HOSPITAL | Age: 43
LOS: 1 days | End: 2018-02-26
Payer: SELF-PAY

## 2018-02-26 VITALS
DIASTOLIC BLOOD PRESSURE: 99 MMHG | HEIGHT: 65 IN | WEIGHT: 180 LBS | SYSTOLIC BLOOD PRESSURE: 165 MMHG | BODY MASS INDEX: 29.99 KG/M2 | HEART RATE: 70 BPM

## 2018-02-26 DIAGNOSIS — Z00.00 ENCOUNTER FOR GENERAL ADULT MEDICAL EXAMINATION WITHOUT ABNORMAL FINDINGS: ICD-10-CM

## 2018-02-26 PROCEDURE — G0463: CPT

## 2018-02-28 DIAGNOSIS — K80.20 CALCULUS OF GALLBLADDER WITHOUT CHOLECYSTITIS WITHOUT OBSTRUCTION: ICD-10-CM

## 2018-05-08 ENCOUNTER — APPOINTMENT (OUTPATIENT)
Dept: GASTROENTEROLOGY | Facility: HOSPITAL | Age: 43
End: 2018-05-08

## 2018-07-26 PROBLEM — Z80.0 FAMILY HISTORY OF COLON CANCER: Status: INACTIVE | Noted: 2017-08-22

## 2019-04-12 ENCOUNTER — EMERGENCY (EMERGENCY)
Facility: HOSPITAL | Age: 44
LOS: 0 days | Discharge: HOME | End: 2019-04-12
Attending: EMERGENCY MEDICINE | Admitting: EMERGENCY MEDICINE
Payer: MEDICAID

## 2019-04-12 VITALS
DIASTOLIC BLOOD PRESSURE: 96 MMHG | HEART RATE: 71 BPM | TEMPERATURE: 98 F | OXYGEN SATURATION: 100 % | SYSTOLIC BLOOD PRESSURE: 135 MMHG | RESPIRATION RATE: 19 BRPM

## 2019-04-12 VITALS
SYSTOLIC BLOOD PRESSURE: 152 MMHG | DIASTOLIC BLOOD PRESSURE: 95 MMHG | RESPIRATION RATE: 19 BRPM | HEART RATE: 63 BPM | TEMPERATURE: 97 F | HEIGHT: 67 IN | OXYGEN SATURATION: 100 % | WEIGHT: 175.05 LBS

## 2019-04-12 DIAGNOSIS — R10.9 UNSPECIFIED ABDOMINAL PAIN: ICD-10-CM

## 2019-04-12 DIAGNOSIS — R11.2 NAUSEA WITH VOMITING, UNSPECIFIED: ICD-10-CM

## 2019-04-12 DIAGNOSIS — K80.50 CALCULUS OF BILE DUCT WITHOUT CHOLANGITIS OR CHOLECYSTITIS WITHOUT OBSTRUCTION: ICD-10-CM

## 2019-04-12 LAB
ALBUMIN SERPL ELPH-MCNC: 4.2 G/DL — SIGNIFICANT CHANGE UP (ref 3.3–5)
ALP SERPL-CCNC: 53 U/L — SIGNIFICANT CHANGE UP (ref 40–120)
ALT FLD-CCNC: 30 U/L — SIGNIFICANT CHANGE UP (ref 12–78)
ANION GAP SERPL CALC-SCNC: 8 MMOL/L — SIGNIFICANT CHANGE UP (ref 5–17)
APPEARANCE UR: CLEAR — SIGNIFICANT CHANGE UP
AST SERPL-CCNC: 21 U/L — SIGNIFICANT CHANGE UP (ref 15–37)
BASOPHILS # BLD AUTO: 0.06 K/UL — SIGNIFICANT CHANGE UP (ref 0–0.2)
BASOPHILS NFR BLD AUTO: 0.5 % — SIGNIFICANT CHANGE UP (ref 0–2)
BILIRUB SERPL-MCNC: 0.6 MG/DL — SIGNIFICANT CHANGE UP (ref 0.2–1.2)
BILIRUB UR-MCNC: NEGATIVE — SIGNIFICANT CHANGE UP
BUN SERPL-MCNC: 14 MG/DL — SIGNIFICANT CHANGE UP (ref 7–23)
CALCIUM SERPL-MCNC: 8 MG/DL — LOW (ref 8.5–10.1)
CHLORIDE SERPL-SCNC: 105 MMOL/L — SIGNIFICANT CHANGE UP (ref 96–108)
CO2 SERPL-SCNC: 27 MMOL/L — SIGNIFICANT CHANGE UP (ref 22–31)
COLOR SPEC: YELLOW — SIGNIFICANT CHANGE UP
CREAT SERPL-MCNC: 0.82 MG/DL — SIGNIFICANT CHANGE UP (ref 0.5–1.3)
DIFF PNL FLD: NEGATIVE — SIGNIFICANT CHANGE UP
EOSINOPHIL # BLD AUTO: 0.13 K/UL — SIGNIFICANT CHANGE UP (ref 0–0.5)
EOSINOPHIL NFR BLD AUTO: 1.1 % — SIGNIFICANT CHANGE UP (ref 0–6)
GLUCOSE SERPL-MCNC: 167 MG/DL — HIGH (ref 70–99)
GLUCOSE UR QL: NEGATIVE MG/DL — SIGNIFICANT CHANGE UP
HCT VFR BLD CALC: 46 % — SIGNIFICANT CHANGE UP (ref 39–50)
HGB BLD-MCNC: 15.1 G/DL — SIGNIFICANT CHANGE UP (ref 13–17)
IMM GRANULOCYTES NFR BLD AUTO: 0.3 % — SIGNIFICANT CHANGE UP (ref 0–1.5)
KETONES UR-MCNC: ABNORMAL
LEUKOCYTE ESTERASE UR-ACNC: NEGATIVE — SIGNIFICANT CHANGE UP
LIDOCAIN IGE QN: 134 U/L — SIGNIFICANT CHANGE UP (ref 73–393)
LYMPHOCYTES # BLD AUTO: 29.8 % — SIGNIFICANT CHANGE UP (ref 13–44)
LYMPHOCYTES # BLD AUTO: 3.45 K/UL — HIGH (ref 1–3.3)
MCHC RBC-ENTMCNC: 29.2 PG — SIGNIFICANT CHANGE UP (ref 27–34)
MCHC RBC-ENTMCNC: 32.8 GM/DL — SIGNIFICANT CHANGE UP (ref 32–36)
MCV RBC AUTO: 88.8 FL — SIGNIFICANT CHANGE UP (ref 80–100)
MONOCYTES # BLD AUTO: 0.69 K/UL — SIGNIFICANT CHANGE UP (ref 0–0.9)
MONOCYTES NFR BLD AUTO: 6 % — SIGNIFICANT CHANGE UP (ref 2–14)
NEUTROPHILS # BLD AUTO: 7.22 K/UL — SIGNIFICANT CHANGE UP (ref 1.8–7.4)
NEUTROPHILS NFR BLD AUTO: 62.3 % — SIGNIFICANT CHANGE UP (ref 43–77)
NITRITE UR-MCNC: NEGATIVE — SIGNIFICANT CHANGE UP
NRBC # BLD: 0 /100 WBCS — SIGNIFICANT CHANGE UP (ref 0–0)
PH UR: 6 — SIGNIFICANT CHANGE UP (ref 5–8)
PLATELET # BLD AUTO: 220 K/UL — SIGNIFICANT CHANGE UP (ref 150–400)
POTASSIUM SERPL-MCNC: 3.7 MMOL/L — SIGNIFICANT CHANGE UP (ref 3.5–5.3)
POTASSIUM SERPL-SCNC: 3.7 MMOL/L — SIGNIFICANT CHANGE UP (ref 3.5–5.3)
PROT SERPL-MCNC: 7.9 GM/DL — SIGNIFICANT CHANGE UP (ref 6–8.3)
PROT UR-MCNC: NEGATIVE MG/DL — SIGNIFICANT CHANGE UP
RBC # BLD: 5.18 M/UL — SIGNIFICANT CHANGE UP (ref 4.2–5.8)
RBC # FLD: 12.2 % — SIGNIFICANT CHANGE UP (ref 10.3–14.5)
SODIUM SERPL-SCNC: 140 MMOL/L — SIGNIFICANT CHANGE UP (ref 135–145)
SP GR SPEC: 1.02 — SIGNIFICANT CHANGE UP (ref 1.01–1.02)
UROBILINOGEN FLD QL: NEGATIVE MG/DL — SIGNIFICANT CHANGE UP
WBC # BLD: 11.58 K/UL — HIGH (ref 3.8–10.5)
WBC # FLD AUTO: 11.58 K/UL — HIGH (ref 3.8–10.5)

## 2019-04-12 PROCEDURE — 76705 ECHO EXAM OF ABDOMEN: CPT | Mod: 26

## 2019-04-12 PROCEDURE — 99284 EMERGENCY DEPT VISIT MOD MDM: CPT | Mod: 25

## 2019-04-12 RX ORDER — SODIUM CHLORIDE 9 MG/ML
1000 INJECTION INTRAMUSCULAR; INTRAVENOUS; SUBCUTANEOUS ONCE
Qty: 0 | Refills: 0 | Status: COMPLETED | OUTPATIENT
Start: 2019-04-12 | End: 2019-04-12

## 2019-04-12 RX ORDER — ONDANSETRON 8 MG/1
4 TABLET, FILM COATED ORAL ONCE
Qty: 0 | Refills: 0 | Status: COMPLETED | OUTPATIENT
Start: 2019-04-12 | End: 2019-04-12

## 2019-04-12 RX ORDER — SODIUM CHLORIDE 9 MG/ML
3 INJECTION INTRAMUSCULAR; INTRAVENOUS; SUBCUTANEOUS ONCE
Qty: 0 | Refills: 0 | Status: COMPLETED | OUTPATIENT
Start: 2019-04-12 | End: 2019-04-12

## 2019-04-12 RX ORDER — KETOROLAC TROMETHAMINE 30 MG/ML
30 SYRINGE (ML) INJECTION ONCE
Qty: 0 | Refills: 0 | Status: DISCONTINUED | OUTPATIENT
Start: 2019-04-12 | End: 2019-04-12

## 2019-04-12 RX ADMIN — Medication 30 MILLIGRAM(S): at 03:58

## 2019-04-12 RX ADMIN — Medication 30 MILLIGRAM(S): at 03:43

## 2019-04-12 RX ADMIN — SODIUM CHLORIDE 3 MILLILITER(S): 9 INJECTION INTRAMUSCULAR; INTRAVENOUS; SUBCUTANEOUS at 03:43

## 2019-04-12 RX ADMIN — SODIUM CHLORIDE 1000 MILLILITER(S): 9 INJECTION INTRAMUSCULAR; INTRAVENOUS; SUBCUTANEOUS at 03:43

## 2019-04-12 RX ADMIN — SODIUM CHLORIDE 1000 MILLILITER(S): 9 INJECTION INTRAMUSCULAR; INTRAVENOUS; SUBCUTANEOUS at 04:43

## 2019-04-12 NOTE — ED PROVIDER NOTE - CARE PROVIDER_API CALL
Tio Castillo)  Surgery  [address] [place] [address]  Phone: [phone]  Fax: [phone]  Follow Up Time:     Kaushik Magana)  Gastroenterology  [address]  [place] [address]  Phone: [phone]  Fax: [phone]  Follow Up Time:

## 2019-04-12 NOTE — ED ADULT NURSE NOTE - OBJECTIVE STATEMENT
patient sitting up in stretcher in no acute distress. A&Ox4. complains of right upper quadrant pain that began around midnight with nausea and vomiting. color good. skin warm and dry. respirations even and unlabored. abdomen soft, tender in RUQ. + bowel sounds noted in all four quadrants.

## 2019-04-12 NOTE — ED PROVIDER NOTE - PROGRESS NOTE DETAILS
Pt. without any pain at this time.  Abdominal exam nontender.  Patient feeling better and able to tolerate PO liquids.  Given Gen Surg and GI referral for biliary colic and sludge in gallbladder.

## 2019-04-12 NOTE — ED PROVIDER NOTE - OBJECTIVE STATEMENT
Pt. is a 44 yo M BIB family for upper abdominal pain, nausea and vomiting X 3 hours.  Patient has had similar pain in the past and had workup which was negative.  Pt. took an antacid prior to arrival without any relief. Pt. is a 42 yo M BIB family for upper abdominal pain, nausea and vomiting X 3 hours.  Patient has had similar pain in the past and had workup which was negative.  Pt. took an antacid prior to arrival without any relief.  Pain is located in upper abdomen mostly right upper quadrant and is not radiating.  No back pain, dysuria, or hematuria.  Pt. ate chicken dinner last night without any complications. Denies fever, cough, chest pain, SOB, or diarrhea.

## 2019-05-26 ENCOUNTER — EMERGENCY (EMERGENCY)
Facility: HOSPITAL | Age: 44
LOS: 0 days | Discharge: HOME | End: 2019-05-26
Attending: EMERGENCY MEDICINE | Admitting: EMERGENCY MEDICINE
Payer: MEDICAID

## 2019-05-26 VITALS
OXYGEN SATURATION: 100 % | TEMPERATURE: 98 F | RESPIRATION RATE: 18 BRPM | HEART RATE: 62 BPM | WEIGHT: 149.91 LBS | HEIGHT: 70 IN | SYSTOLIC BLOOD PRESSURE: 169 MMHG | DIASTOLIC BLOOD PRESSURE: 112 MMHG

## 2019-05-26 VITALS
HEART RATE: 61 BPM | DIASTOLIC BLOOD PRESSURE: 97 MMHG | OXYGEN SATURATION: 100 % | RESPIRATION RATE: 18 BRPM | SYSTOLIC BLOOD PRESSURE: 140 MMHG | TEMPERATURE: 98 F

## 2019-05-26 DIAGNOSIS — R10.9 UNSPECIFIED ABDOMINAL PAIN: ICD-10-CM

## 2019-05-26 DIAGNOSIS — K83.8 OTHER SPECIFIED DISEASES OF BILIARY TRACT: ICD-10-CM

## 2019-05-26 LAB
ALBUMIN SERPL ELPH-MCNC: 4.1 G/DL — SIGNIFICANT CHANGE UP (ref 3.3–5)
ALP SERPL-CCNC: 57 U/L — SIGNIFICANT CHANGE UP (ref 40–120)
ALT FLD-CCNC: 36 U/L — SIGNIFICANT CHANGE UP (ref 12–78)
ANION GAP SERPL CALC-SCNC: 8 MMOL/L — SIGNIFICANT CHANGE UP (ref 5–17)
AST SERPL-CCNC: 49 U/L — HIGH (ref 15–37)
BASOPHILS # BLD AUTO: 0 K/UL — SIGNIFICANT CHANGE UP (ref 0–0.2)
BASOPHILS NFR BLD AUTO: 0 % — SIGNIFICANT CHANGE UP (ref 0–2)
BILIRUB SERPL-MCNC: 0.5 MG/DL — SIGNIFICANT CHANGE UP (ref 0.2–1.2)
BUN SERPL-MCNC: 21 MG/DL — SIGNIFICANT CHANGE UP (ref 7–23)
CALCIUM SERPL-MCNC: 8.6 MG/DL — SIGNIFICANT CHANGE UP (ref 8.5–10.1)
CHLORIDE SERPL-SCNC: 106 MMOL/L — SIGNIFICANT CHANGE UP (ref 96–108)
CO2 SERPL-SCNC: 27 MMOL/L — SIGNIFICANT CHANGE UP (ref 22–31)
CREAT SERPL-MCNC: 1.03 MG/DL — SIGNIFICANT CHANGE UP (ref 0.5–1.3)
EOSINOPHIL # BLD AUTO: 0.64 K/UL — HIGH (ref 0–0.5)
EOSINOPHIL NFR BLD AUTO: 5 % — SIGNIFICANT CHANGE UP (ref 0–6)
GLUCOSE SERPL-MCNC: 124 MG/DL — HIGH (ref 70–99)
HCT VFR BLD CALC: 44.9 % — SIGNIFICANT CHANGE UP (ref 39–50)
HGB BLD-MCNC: 14.8 G/DL — SIGNIFICANT CHANGE UP (ref 13–17)
LIDOCAIN IGE QN: 203 U/L — SIGNIFICANT CHANGE UP (ref 73–393)
LYMPHOCYTES # BLD AUTO: 58 % — HIGH (ref 13–44)
LYMPHOCYTES # BLD AUTO: 7.44 K/UL — HIGH (ref 1–3.3)
MANUAL SMEAR VERIFICATION: SIGNIFICANT CHANGE UP
MCHC RBC-ENTMCNC: 29.5 PG — SIGNIFICANT CHANGE UP (ref 27–34)
MCHC RBC-ENTMCNC: 33 GM/DL — SIGNIFICANT CHANGE UP (ref 32–36)
MCV RBC AUTO: 89.4 FL — SIGNIFICANT CHANGE UP (ref 80–100)
MONOCYTES # BLD AUTO: 0.64 K/UL — SIGNIFICANT CHANGE UP (ref 0–0.9)
MONOCYTES NFR BLD AUTO: 5 % — SIGNIFICANT CHANGE UP (ref 2–14)
NEUTROPHILS # BLD AUTO: 3.98 K/UL — SIGNIFICANT CHANGE UP (ref 1.8–7.4)
NEUTROPHILS NFR BLD AUTO: 31 % — LOW (ref 43–77)
NRBC # BLD: 0 /100 — SIGNIFICANT CHANGE UP (ref 0–0)
NRBC # BLD: SIGNIFICANT CHANGE UP /100 WBCS (ref 0–0)
PLAT MORPH BLD: NORMAL — SIGNIFICANT CHANGE UP
PLATELET # BLD AUTO: 274 K/UL — SIGNIFICANT CHANGE UP (ref 150–400)
POTASSIUM SERPL-MCNC: 3.9 MMOL/L — SIGNIFICANT CHANGE UP (ref 3.5–5.3)
POTASSIUM SERPL-SCNC: 3.9 MMOL/L — SIGNIFICANT CHANGE UP (ref 3.5–5.3)
PROT SERPL-MCNC: 8.1 GM/DL — SIGNIFICANT CHANGE UP (ref 6–8.3)
RBC # BLD: 5.02 M/UL — SIGNIFICANT CHANGE UP (ref 4.2–5.8)
RBC # FLD: 12.2 % — SIGNIFICANT CHANGE UP (ref 10.3–14.5)
RBC BLD AUTO: NORMAL — SIGNIFICANT CHANGE UP
SODIUM SERPL-SCNC: 141 MMOL/L — SIGNIFICANT CHANGE UP (ref 135–145)
VARIANT LYMPHS # BLD: 1 % — SIGNIFICANT CHANGE UP (ref 0–6)
WBC # BLD: 12.83 K/UL — HIGH (ref 3.8–10.5)
WBC # FLD AUTO: 12.83 K/UL — HIGH (ref 3.8–10.5)

## 2019-05-26 PROCEDURE — 99284 EMERGENCY DEPT VISIT MOD MDM: CPT | Mod: 25

## 2019-05-26 PROCEDURE — 76705 ECHO EXAM OF ABDOMEN: CPT | Mod: 26

## 2019-05-26 RX ORDER — KETOROLAC TROMETHAMINE 30 MG/ML
30 SYRINGE (ML) INJECTION ONCE
Refills: 0 | Status: DISCONTINUED | OUTPATIENT
Start: 2019-05-26 | End: 2019-05-26

## 2019-05-26 RX ORDER — ONDANSETRON 8 MG/1
4 TABLET, FILM COATED ORAL ONCE
Refills: 0 | Status: COMPLETED | OUTPATIENT
Start: 2019-05-26 | End: 2019-05-26

## 2019-05-26 RX ORDER — OXYCODONE AND ACETAMINOPHEN 5; 325 MG/1; MG/1
1 TABLET ORAL
Qty: 8 | Refills: 0
Start: 2019-05-26

## 2019-05-26 RX ORDER — FAMOTIDINE 10 MG/ML
20 INJECTION INTRAVENOUS ONCE
Refills: 0 | Status: COMPLETED | OUTPATIENT
Start: 2019-05-26 | End: 2019-05-26

## 2019-05-26 RX ORDER — SODIUM CHLORIDE 9 MG/ML
1000 INJECTION INTRAMUSCULAR; INTRAVENOUS; SUBCUTANEOUS ONCE
Refills: 0 | Status: COMPLETED | OUTPATIENT
Start: 2019-05-26 | End: 2019-05-26

## 2019-05-26 RX ORDER — MORPHINE SULFATE 50 MG/1
4 CAPSULE, EXTENDED RELEASE ORAL ONCE
Refills: 0 | Status: DISCONTINUED | OUTPATIENT
Start: 2019-05-26 | End: 2019-05-26

## 2019-05-26 RX ADMIN — MORPHINE SULFATE 4 MILLIGRAM(S): 50 CAPSULE, EXTENDED RELEASE ORAL at 03:31

## 2019-05-26 RX ADMIN — SODIUM CHLORIDE 1000 MILLILITER(S): 9 INJECTION INTRAMUSCULAR; INTRAVENOUS; SUBCUTANEOUS at 03:09

## 2019-05-26 RX ADMIN — FAMOTIDINE 20 MILLIGRAM(S): 10 INJECTION INTRAVENOUS at 02:21

## 2019-05-26 RX ADMIN — Medication 30 MILLIGRAM(S): at 02:21

## 2019-05-26 RX ADMIN — ONDANSETRON 4 MILLIGRAM(S): 8 TABLET, FILM COATED ORAL at 02:21

## 2019-05-26 RX ADMIN — Medication 30 MILLIGRAM(S): at 02:36

## 2019-05-26 RX ADMIN — ONDANSETRON 4 MILLIGRAM(S): 8 TABLET, FILM COATED ORAL at 02:29

## 2019-05-26 RX ADMIN — SODIUM CHLORIDE 1000 MILLILITER(S): 9 INJECTION INTRAMUSCULAR; INTRAVENOUS; SUBCUTANEOUS at 02:09

## 2019-05-26 NOTE — ED PROVIDER NOTE - OBJECTIVE STATEMENT
43 yo male with no PMH c/o abdominal pain.  Pt ate meat from a [employer] tonight and shortly after developed the pain.  +n/v.  No fever.  Pt reports identical pain on multiple previous occasions.  Was seen in ED, diagnosed with GB sludge.  Advised f/u with surgery but did not follow up.  Taking omeperazole.  Nonsmoker and nondrinker.

## 2019-05-26 NOTE — ED PROVIDER NOTE - NSFOLLOWUPINSTRUCTIONS_ED_ALL_ED_FT
Follow up with surgery this week  AVOID foods that are high fat or fried  Return to ER for any worsening pain, fever, vomiting or any other concerns  if pain recurs, try a Percocet at home, if it doesn't help, come to ED for evaluation

## 2019-05-26 NOTE — ED PROVIDER NOTE - CONSTITUTIONAL, MLM
normal... Well appearing, well nourished, awake, alert, oriented to person, place, time/situation; appears uncomfortable

## 2019-05-26 NOTE — ED ADULT NURSE NOTE - NSIMPLEMENTINTERV_GEN_ALL_ED
Implemented All Fall Risk Interventions:  [place] to call system. Call bell, personal items and telephone within reach. Instruct patient to call for assistance. Room bathroom lighting operational. Non-slip footwear when patient is off stretcher. Physically safe environment: no spills, clutter or unnecessary equipment. Stretcher in lowest position, wheels locked, appropriate side rails in place. Provide visual cue, wrist band, yellow gown, etc. Monitor gait and stability. Monitor for mental status changes and reorient to person, place, and time. Review medications for side effects contributing to fall risk. Reinforce activity limits and safety measures with patient and family.

## 2019-05-26 NOTE — ED PROVIDER NOTE - PROGRESS NOTE DETAILS
results d/w pt.  pt will f/u with surgery.  dietary modifications again discussed.  will give pain meds for home.  pt still with some discomfort, will give morphine prior to d/c home.

## 2019-05-26 NOTE — ED ADULT NURSE NOTE - OBJECTIVE STATEMENT
Pt presented to ED c/o epigastric pain, nausea without vomiting after eating meat at a bbq tonight. Pt states this happens whenever he eats meat and is on PO Omeprazole at home; did not take PTA. Maintained NPO. Denies fever, chills, diarrhea. Pt presented to ED c/o epigastric pain, nausea without vomiting after eating meat at a bbq tonight. Pt states this happens whenever he eats meat and is on PO Omeprazole at home; did not take PTA. Maintained NPO. Denies fever, chills, diarrhea, alcohol ingestion

## 2019-06-03 ENCOUNTER — OUTPATIENT (OUTPATIENT)
Dept: OUTPATIENT SERVICES | Facility: HOSPITAL | Age: 44
LOS: 1 days | End: 2019-06-03
Payer: SELF-PAY

## 2019-06-03 ENCOUNTER — APPOINTMENT (OUTPATIENT)
Dept: SURGERY | Facility: HOSPITAL | Age: 44
End: 2019-06-03

## 2019-06-03 VITALS
HEART RATE: 80 BPM | HEIGHT: 65 IN | SYSTOLIC BLOOD PRESSURE: 149 MMHG | DIASTOLIC BLOOD PRESSURE: 91 MMHG | BODY MASS INDEX: 30.16 KG/M2 | WEIGHT: 181 LBS

## 2019-06-03 DIAGNOSIS — R10.9 UNSPECIFIED ABDOMINAL PAIN: ICD-10-CM

## 2019-06-03 PROCEDURE — G0463: CPT

## 2019-06-03 RX ORDER — FAMOTIDINE 20 MG/1
20 TABLET, FILM COATED ORAL DAILY
Qty: 30 | Refills: 1 | Status: DISCONTINUED | COMMUNITY
Start: 2017-11-21 | End: 2019-06-03

## 2019-06-03 RX ORDER — OXYCODONE AND ACETAMINOPHEN 5; 325 MG/1; MG/1
5-325 TABLET ORAL
Refills: 0 | Status: DISCONTINUED | COMMUNITY
Start: 2019-06-03 | End: 2019-06-03

## 2019-06-03 NOTE — ADDENDUM
[FreeTextEntry1] : Patient seen and examined with house staff\par Presents for evaluation of RUQ / epigastric pain\par Imaging negative for cholelithiasis, and HIDA negative during a painful episode.\par Although acalculous cholecystitis is possible, I am not sure that the patients gallbladder is the source of his pain.\par Patient advised to come to ED for evaluation and additional imaging if pain returns.  \par

## 2019-06-03 NOTE — PHYSICAL EXAM
[Normal Thyroid] : the thyroid was normal [Normal Rate and Rhythm] : normal rate and rhythm [Normal Breath Sounds] : Normal breath sounds [Normal Heart Sounds] : normal heart sounds [Oriented to Person] : oriented to person [Alert] : alert [Oriented to Place] : oriented to place [Oriented to Time] : oriented to time [JVD] : no jugular venous distention  [Carotid Bruits] : no carotid bruits [Right Carotid Bruit] : no bruit heard over the right carotid [Left Carotid Bruit] : no bruit heard over the left carotid [Right Femoral Bruit] : no bruit heard over the right femoral artery [Left Femoral Bruit] : no bruit heard over the left femoral artery [Abdominal Masses] : No abdominal masses [Abdomen Tenderness] : ~T ~M No abdominal tenderness [Enlarged] : not enlarged [Tender] : was nontender [Occult Blood Positive] : was negative for occult blood [Stool Sample Taken] : No stool obtained  on rectal exam [Purpura] : no purpura  [Petechiae] : no petechiae [de-identified] : normocephalic, atraumatic [de-identified] : not in acute distress [de-identified] : no lymphadenopathy [de-identified] : ND/NT, Beltrán's positive, no masses palpated, no guarding

## 2019-06-03 NOTE — HISTORY OF PRESENT ILLNESS
[de-identified] : 44M w/ no significant PMH/PSH other than chronic PUD, presents after having an episode of post-prandial RUQ pain requiring ED visit on 5/26/19. Pt was referred to surgery clinic from [facility] to get surgical consultation for possible cholecystectomy. Pt's had multiple visits to the ED and OSH for similar pain episodes, received RUQ US x 2, both negative for cholelithiasis or cholecystitis but only gallbladder sludge, and HIDA was also obtained negative for cholecystitis. In addition, pt also received EGD with biopsies showing negative for h.pylori or metaplasia of gastric mucosa. Pt states that he still has post-prandial pain especially after eating spicy food, but denies any other associated sx. Otherwise, pt denies f/c, n/v, CP/SOB

## 2019-06-04 DIAGNOSIS — R10.11 RIGHT UPPER QUADRANT PAIN: ICD-10-CM

## 2019-06-24 ENCOUNTER — INPATIENT (INPATIENT)
Facility: HOSPITAL | Age: 44
LOS: 1 days | Discharge: HOME | DRG: 419 | End: 2019-06-26
Attending: SURGERY | Admitting: SURGERY
Payer: MEDICAID

## 2019-06-24 VITALS
HEIGHT: 68 IN | TEMPERATURE: 98 F | HEART RATE: 100 BPM | SYSTOLIC BLOOD PRESSURE: 183 MMHG | DIASTOLIC BLOOD PRESSURE: 100 MMHG | RESPIRATION RATE: 18 BRPM | WEIGHT: 179.9 LBS | OXYGEN SATURATION: 100 %

## 2019-06-24 DIAGNOSIS — K81.0 ACUTE CHOLECYSTITIS: ICD-10-CM

## 2019-06-24 LAB
ALBUMIN SERPL ELPH-MCNC: 4.6 G/DL — SIGNIFICANT CHANGE UP (ref 3.3–5)
ALP SERPL-CCNC: 55 U/L — SIGNIFICANT CHANGE UP (ref 40–120)
ALT FLD-CCNC: 29 U/L — SIGNIFICANT CHANGE UP (ref 10–45)
ANION GAP SERPL CALC-SCNC: 12 MMOL/L — SIGNIFICANT CHANGE UP (ref 5–17)
APTT BLD: 24.9 SEC — LOW (ref 27.5–36.3)
AST SERPL-CCNC: 19 U/L — SIGNIFICANT CHANGE UP (ref 10–40)
BASOPHILS # BLD AUTO: 0.1 K/UL — SIGNIFICANT CHANGE UP (ref 0–0.2)
BASOPHILS NFR BLD AUTO: 0.5 % — SIGNIFICANT CHANGE UP (ref 0–2)
BILIRUB SERPL-MCNC: 0.3 MG/DL — SIGNIFICANT CHANGE UP (ref 0.2–1.2)
BLD GP AB SCN SERPL QL: NEGATIVE — SIGNIFICANT CHANGE UP
BUN SERPL-MCNC: 15 MG/DL — SIGNIFICANT CHANGE UP (ref 7–23)
CALCIUM SERPL-MCNC: 9.2 MG/DL — SIGNIFICANT CHANGE UP (ref 8.4–10.5)
CHLORIDE SERPL-SCNC: 101 MMOL/L — SIGNIFICANT CHANGE UP (ref 96–108)
CO2 SERPL-SCNC: 25 MMOL/L — SIGNIFICANT CHANGE UP (ref 22–31)
CREAT SERPL-MCNC: 0.82 MG/DL — SIGNIFICANT CHANGE UP (ref 0.5–1.3)
EOSINOPHIL # BLD AUTO: 0.2 K/UL — SIGNIFICANT CHANGE UP (ref 0–0.5)
EOSINOPHIL NFR BLD AUTO: 1.1 % — SIGNIFICANT CHANGE UP (ref 0–6)
GAS PNL BLDV: SIGNIFICANT CHANGE UP
GLUCOSE SERPL-MCNC: 193 MG/DL — HIGH (ref 70–99)
HCT VFR BLD CALC: 48.8 % — SIGNIFICANT CHANGE UP (ref 39–50)
HGB BLD-MCNC: 16.1 G/DL — SIGNIFICANT CHANGE UP (ref 13–17)
INR BLD: 0.94 RATIO — SIGNIFICANT CHANGE UP (ref 0.88–1.16)
LIDOCAIN IGE QN: 29 U/L — SIGNIFICANT CHANGE UP (ref 7–60)
LYMPHOCYTES # BLD AUTO: 28.7 % — SIGNIFICANT CHANGE UP (ref 13–44)
LYMPHOCYTES # BLD AUTO: 3.9 K/UL — HIGH (ref 1–3.3)
MCHC RBC-ENTMCNC: 29 PG — SIGNIFICANT CHANGE UP (ref 27–34)
MCHC RBC-ENTMCNC: 32.9 GM/DL — SIGNIFICANT CHANGE UP (ref 32–36)
MCV RBC AUTO: 88.1 FL — SIGNIFICANT CHANGE UP (ref 80–100)
MONOCYTES # BLD AUTO: 0.8 K/UL — SIGNIFICANT CHANGE UP (ref 0–0.9)
MONOCYTES NFR BLD AUTO: 6.1 % — SIGNIFICANT CHANGE UP (ref 2–14)
NEUTROPHILS # BLD AUTO: 8.7 K/UL — HIGH (ref 1.8–7.4)
NEUTROPHILS NFR BLD AUTO: 63.6 % — SIGNIFICANT CHANGE UP (ref 43–77)
PLATELET # BLD AUTO: 235 K/UL — SIGNIFICANT CHANGE UP (ref 150–400)
POTASSIUM SERPL-MCNC: 3.7 MMOL/L — SIGNIFICANT CHANGE UP (ref 3.5–5.3)
POTASSIUM SERPL-SCNC: 3.7 MMOL/L — SIGNIFICANT CHANGE UP (ref 3.5–5.3)
PROT SERPL-MCNC: 7.6 G/DL — SIGNIFICANT CHANGE UP (ref 6–8.3)
PROTHROM AB SERPL-ACNC: 10.7 SEC — SIGNIFICANT CHANGE UP (ref 10–12.9)
RBC # BLD: 5.54 M/UL — SIGNIFICANT CHANGE UP (ref 4.2–5.8)
RBC # FLD: 11.7 % — SIGNIFICANT CHANGE UP (ref 10.3–14.5)
RH IG SCN BLD-IMP: POSITIVE — SIGNIFICANT CHANGE UP
SODIUM SERPL-SCNC: 138 MMOL/L — SIGNIFICANT CHANGE UP (ref 135–145)
WBC # BLD: 13.6 K/UL — HIGH (ref 3.8–10.5)
WBC # FLD AUTO: 13.6 K/UL — HIGH (ref 3.8–10.5)

## 2019-06-24 PROCEDURE — 76705 ECHO EXAM OF ABDOMEN: CPT | Mod: 26,RT

## 2019-06-24 PROCEDURE — 76705 ECHO EXAM OF ABDOMEN: CPT | Mod: 26

## 2019-06-24 PROCEDURE — 99285 EMERGENCY DEPT VISIT HI MDM: CPT

## 2019-06-24 RX ORDER — ENOXAPARIN SODIUM 100 MG/ML
40 INJECTION SUBCUTANEOUS DAILY
Refills: 0 | Status: DISCONTINUED | OUTPATIENT
Start: 2019-06-24 | End: 2019-06-26

## 2019-06-24 RX ORDER — ONDANSETRON 8 MG/1
4 TABLET, FILM COATED ORAL EVERY 6 HOURS
Refills: 0 | Status: DISCONTINUED | OUTPATIENT
Start: 2019-06-24 | End: 2019-06-26

## 2019-06-24 RX ORDER — MORPHINE SULFATE 50 MG/1
4 CAPSULE, EXTENDED RELEASE ORAL ONCE
Refills: 0 | Status: DISCONTINUED | OUTPATIENT
Start: 2019-06-24 | End: 2019-06-24

## 2019-06-24 RX ORDER — ACETAMINOPHEN 500 MG
1000 TABLET ORAL ONCE
Refills: 0 | Status: COMPLETED | OUTPATIENT
Start: 2019-06-24 | End: 2019-06-24

## 2019-06-24 RX ORDER — SODIUM CHLORIDE 9 MG/ML
1000 INJECTION, SOLUTION INTRAVENOUS
Refills: 0 | Status: DISCONTINUED | OUTPATIENT
Start: 2019-06-24 | End: 2019-06-26

## 2019-06-24 RX ORDER — CEFOTETAN DISODIUM 1 G
2 VIAL (EA) INJECTION ONCE
Refills: 0 | Status: COMPLETED | OUTPATIENT
Start: 2019-06-24 | End: 2019-06-24

## 2019-06-24 RX ORDER — CEFOTETAN DISODIUM 1 G
2 VIAL (EA) INJECTION EVERY 12 HOURS
Refills: 0 | Status: DISCONTINUED | OUTPATIENT
Start: 2019-06-24 | End: 2019-06-26

## 2019-06-24 RX ORDER — HYDROMORPHONE HYDROCHLORIDE 2 MG/ML
0.5 INJECTION INTRAMUSCULAR; INTRAVENOUS; SUBCUTANEOUS EVERY 4 HOURS
Refills: 0 | Status: DISCONTINUED | OUTPATIENT
Start: 2019-06-24 | End: 2019-06-26

## 2019-06-24 RX ORDER — ONDANSETRON 8 MG/1
4 TABLET, FILM COATED ORAL ONCE
Refills: 0 | Status: COMPLETED | OUTPATIENT
Start: 2019-06-24 | End: 2019-06-24

## 2019-06-24 RX ORDER — SODIUM CHLORIDE 9 MG/ML
1000 INJECTION INTRAMUSCULAR; INTRAVENOUS; SUBCUTANEOUS ONCE
Refills: 0 | Status: COMPLETED | OUTPATIENT
Start: 2019-06-24 | End: 2019-06-24

## 2019-06-24 RX ADMIN — SODIUM CHLORIDE 1000 MILLILITER(S): 9 INJECTION INTRAMUSCULAR; INTRAVENOUS; SUBCUTANEOUS at 05:22

## 2019-06-24 RX ADMIN — Medication 1000 MILLIGRAM(S): at 20:10

## 2019-06-24 RX ADMIN — ONDANSETRON 4 MILLIGRAM(S): 8 TABLET, FILM COATED ORAL at 05:23

## 2019-06-24 RX ADMIN — MORPHINE SULFATE 4 MILLIGRAM(S): 50 CAPSULE, EXTENDED RELEASE ORAL at 06:23

## 2019-06-24 RX ADMIN — SODIUM CHLORIDE 1000 MILLILITER(S): 9 INJECTION INTRAMUSCULAR; INTRAVENOUS; SUBCUTANEOUS at 06:56

## 2019-06-24 RX ADMIN — SODIUM CHLORIDE 125 MILLILITER(S): 9 INJECTION, SOLUTION INTRAVENOUS at 11:18

## 2019-06-24 RX ADMIN — Medication 400 MILLIGRAM(S): at 19:55

## 2019-06-24 RX ADMIN — MORPHINE SULFATE 4 MILLIGRAM(S): 50 CAPSULE, EXTENDED RELEASE ORAL at 05:23

## 2019-06-24 RX ADMIN — ENOXAPARIN SODIUM 40 MILLIGRAM(S): 100 INJECTION SUBCUTANEOUS at 16:53

## 2019-06-24 RX ADMIN — Medication 100 GRAM(S): at 21:28

## 2019-06-24 RX ADMIN — Medication 100 GRAM(S): at 11:18

## 2019-06-24 NOTE — ED PROVIDER NOTE - PROGRESS NOTE DETAILS
Jerrell Adorno, PGY-1 EM: patient no longer in pain but is tender with palpation of the RUQ. pt endorsed to me by óscar stoddard. us shows 2cm x 7 mm SIN. dw surg, will see pt. exam by me same as above. -Toan Fernandez MD-

## 2019-06-24 NOTE — H&P ADULT - NSHPLABSRESULTS_GEN_ALL_CORE
Vital Signs Last 24 Hrs  T(C): 36.6 (24 Jun 2019 07:05), Max: 36.7 (24 Jun 2019 04:40)  T(F): 97.9 (24 Jun 2019 07:05), Max: 98 (24 Jun 2019 04:40)  HR: 67 (24 Jun 2019 07:05) (59 - 100)  BP: 125/84 (24 Jun 2019 07:05) (125/84 - 183/100)  BP(mean): --  RR: 16 (24 Jun 2019 07:05) (16 - 18)  SpO2: 95% (24 Jun 2019 07:05) (95% - 100%)      LABS:                        16.1   13.6  )-----------( 235      ( 24 Jun 2019 05:32 )             48.8     06-24    138  |  101  |  15  ----------------------------<  193<H>  3.7   |  25  |  0.82    Ca    9.2      24 Jun 2019 05:32    TPro  7.6  /  Alb  4.6  /  TBili  0.3  /  DBili  x   /  AST  19  /  ALT  29  /  AlkPhos  55  06-24    PT/INR - ( 24 Jun 2019 05:32 )   PT: 10.7 sec;   INR: 0.94 ratio         PTT - ( 24 Jun 2019 05:32 )  PTT:24.9 sec      INs and OUTs:    < from: US Abdomen Upper Quadrant Right (06.24.19 @ 09:06) >    Impression: 2 cm x 7 mm gallstone distal neck region. Borderline   thickening of the gallbladder wall. There is no pericholecystic fluid.   Cannot assess for sonographic Beltrán sign as patient was given pain   medication.    Mild diffuse increased echogenicity of the liver suggests diffuse hepatic   steatosis.

## 2019-06-24 NOTE — ED PROVIDER NOTE - ATTENDING CONTRIBUTION TO CARE
Patient with moderate right upper quadrant pain 3 hours. Persistent. Colicky. Moderate to severe.   right upper quadrant tenderness to palpation  mild distress   non-tachycardic, non-tachypneic   cooperative  non-distended   no rebound/guarding   no edema  no rash/vesicles/petechiae   pleasant  concern for right upper quadrant pain in patient with known gb disease  will get iv, cbc, cmp, ultrasound at bedside limited secondary to habitus and will obtain ultrasound by radiology, surgery consult prn  Will follow up on labs, analgesia, imaging, reassess and disposition as clinically indicated. Patient with moderate right upper quadrant pain 3 hours. Persistent. Colicky. Moderate to severe.   right upper quadrant tenderness to palpation  mild distress   non-tachycardic, non-tachypneic   cooperative  non-distended   no rebound/guarding   no edema  no rash/vesicles/petechiae   pleasant  concern for right upper quadrant pain in patient with known gb disease consistent with cholecystitis  will get iv, cbc, cmp, ultrasound at bedside limited secondary to habitus and will obtain ultrasound by radiology, surgery consult prn  Will follow up on labs, analgesia, imaging, reassess and disposition as clinically indicated.

## 2019-06-24 NOTE — H&P ADULT - ASSESSMENT
Patient is a 44M no pmh/psh presents with 1 day of RUQ pain.   Patient initially had the pain in 2017 and had extensive work-up. Multiple ultrasounds showing only sludge, a negative HIDA '17, negative endoscopies (9/17), and Clinic/ED visits (2/26/18, 4/12/19, 5/26/19, 6/3/19).   Most recent U/s shows a 2x7cm in the gallbladder neck and patient is tender in the RUQ.    - Admit to surgery  - Cefotetan for IV antibiosis  - Pain and nausea control  - Book for Wednesday 1030am  - Discussed with attending    ATP  [id number]

## 2019-06-24 NOTE — ED ADULT NURSE NOTE - NSIMPLEMENTINTERV_GEN_ALL_ED
Implemented All Fall Risk Interventions:  [place] to call system. Call bell, personal items and telephone within reach. Instruct patient to call for assistance. Room bathroom lighting operational. Non-slip footwear when patient is off stretcher. Physically safe environment: no spills, clutter or unnecessary equipment. Stretcher in lowest position, wheels locked, appropriate side rails in place. Provide visual cue, wrist band, yellow gown, etc. Monitor gait and stability. Monitor for mental status changes and reorient to person, place, and time. Review medications for side effects contributing to fall risk. Reinforce activity limits and safety measures with patient and family.

## 2019-06-24 NOTE — ED ADULT NURSE NOTE - OBJECTIVE STATEMENT
44M primarily [place] speaking c/o pain to RUQ and nausea/vomiting. As per patient's daughter, patient has been seen at [facility] several times for this abd pain, has not been given a diagnosis for the abd pain, and has been told that if the pain returns that they should come to [facility] ED. Patient denies chest pain, SOB, fever. Patient is alert and oriented x4, calm/cooperative, NAD, VSS. Patient has 18 ga placed to LAC in ED.

## 2019-06-24 NOTE — ED ADULT NURSE REASSESSMENT NOTE - NS ED NURSE REASSESS COMMENT FT1
Pt received from LONA Byrd in [facility], alert and oriented times three, breathing spontaneous, unlabored without distress on room air, NAD, VSS, report RUQ pain, abd soft, mild tenderness to RUQ, ND, denies nausea, awaits US results and dispo

## 2019-06-24 NOTE — ED PROVIDER NOTE - SHIFT CHANGE DETAILS
Diego Scales MD FACEP note of transfer at the usual time of sign out: Receiving team will follow up on labs, analgesia, any clinical imaging, reassess and disposition as clinically indicated.  Details of patient and plan conveyed to receiving physician and conveyed back for understanding.  There were no questions at this time about the patient's status, disposition, and plan. Patient's care to be taken over by receiving physician at this time, all decisions regarding the progression of care will be made at their discretion.

## 2019-06-24 NOTE — ED PROVIDER NOTE - CARE PLAN
Principal Discharge DX:	Acute cholecystitis  Goal:	stone in neck Principal Discharge DX:	Acute cholecystitis  Goal:	stone in neck, initial encounter

## 2019-06-24 NOTE — ED PROVIDER NOTE - OBJECTIVE STATEMENT
44M with past medical history of biliary colic with negative gb ultrasounds showing only gb sludge, no stones or acute deniz presents with 3 hr h/o RUQ pain, sharp, colicky with no radiation, a/w nbnb n/v.  Feels like past biliary colic.  Saw general surgeon in clinic last month who recommended conservative treatment and told patient to come to ed for reemergence of symptoms.  Denies fever, chest pain, shortness of breath, diarrhea, dysuria.

## 2019-06-24 NOTE — ED PROVIDER NOTE - PHYSICAL EXAMINATION
***GEN - well appearing; NAD   ***HEAD - NC/AT  ***EYES/NOSE - PEERL, EOMI, mucous membranes moist, no discharge   ***THROAT: Oral cavity and pharynx normal. No inflammation, swelling, exudate, or lesions.    ***NECK: supple, non-tender no lymphadenopathy  ***PULMONARY - CTA b/l, symmetric breath sounds.   ***CARDIAC- s1s2, RRR, no murmur  ***ABDOMEN - +BS, ND, right upper quadrant tenderness to palpation , soft, no guarding, no rebound, no organomegaly  ***BACK - no CVA tenderness, Normal  spine, no spinal TTP  ***EXTREMITIES - symmetric pulses, 2+ dp, capillary refill < 2 seconds, no clubbing, no cyanosis, no edema   ***SKIN - warm, dry, intact, no rash or bruising   ***NEUROLOGIC - a&o x3, CN 3-12 intact, sensation nl, motor 5/5

## 2019-06-24 NOTE — H&P ADULT - HISTORY OF PRESENT ILLNESS
Patient is a 44M no pmh/psh presents with 1 day of RUQ pain. The pain awoke him from sleep at 3am, has been in the RUQ, constant with intermittent increases in severity. Patient had one associated episode of emesis driving to the hospital. No radiation of pain, CP/SOB, fevers.    Patient initially had the pain in 2017 and had extensive work-up. Multiple negative ultrasound, endoscopies (9/17), and Clinic/ED visits (2/26/18, 4/12/19, 5/26/19, 6/3/19).

## 2019-06-25 ENCOUNTER — TRANSCRIPTION ENCOUNTER (OUTPATIENT)
Age: 44
End: 2019-06-25

## 2019-06-25 LAB
ALBUMIN SERPL ELPH-MCNC: 3.7 G/DL — SIGNIFICANT CHANGE UP (ref 3.3–5)
ALP SERPL-CCNC: 48 U/L — SIGNIFICANT CHANGE UP (ref 40–120)
ALT FLD-CCNC: 20 U/L — SIGNIFICANT CHANGE UP (ref 10–45)
ANION GAP SERPL CALC-SCNC: 11 MMOL/L — SIGNIFICANT CHANGE UP (ref 5–17)
APTT BLD: 29.7 SEC — SIGNIFICANT CHANGE UP (ref 27.5–36.3)
AST SERPL-CCNC: 17 U/L — SIGNIFICANT CHANGE UP (ref 10–40)
BASOPHILS # BLD AUTO: 0.05 K/UL — SIGNIFICANT CHANGE UP (ref 0–0.2)
BASOPHILS NFR BLD AUTO: 0.6 % — SIGNIFICANT CHANGE UP (ref 0–2)
BILIRUB SERPL-MCNC: 0.7 MG/DL — SIGNIFICANT CHANGE UP (ref 0.2–1.2)
BUN SERPL-MCNC: 7 MG/DL — SIGNIFICANT CHANGE UP (ref 7–23)
CALCIUM SERPL-MCNC: 8.6 MG/DL — SIGNIFICANT CHANGE UP (ref 8.4–10.5)
CHLORIDE SERPL-SCNC: 101 MMOL/L — SIGNIFICANT CHANGE UP (ref 96–108)
CO2 SERPL-SCNC: 26 MMOL/L — SIGNIFICANT CHANGE UP (ref 22–31)
CREAT SERPL-MCNC: 0.72 MG/DL — SIGNIFICANT CHANGE UP (ref 0.5–1.3)
EOSINOPHIL # BLD AUTO: 0.28 K/UL — SIGNIFICANT CHANGE UP (ref 0–0.5)
EOSINOPHIL NFR BLD AUTO: 3.5 % — SIGNIFICANT CHANGE UP (ref 0–6)
GLUCOSE SERPL-MCNC: 101 MG/DL — HIGH (ref 70–99)
HCT VFR BLD CALC: 43.5 % — SIGNIFICANT CHANGE UP (ref 39–50)
HGB BLD-MCNC: 14.3 G/DL — SIGNIFICANT CHANGE UP (ref 13–17)
IMM GRANULOCYTES NFR BLD AUTO: 0.5 % — SIGNIFICANT CHANGE UP (ref 0–1.5)
INR BLD: 1.03 RATIO — SIGNIFICANT CHANGE UP (ref 0.88–1.16)
LYMPHOCYTES # BLD AUTO: 3.07 K/UL — SIGNIFICANT CHANGE UP (ref 1–3.3)
LYMPHOCYTES # BLD AUTO: 38 % — SIGNIFICANT CHANGE UP (ref 13–44)
MCHC RBC-ENTMCNC: 29.4 PG — SIGNIFICANT CHANGE UP (ref 27–34)
MCHC RBC-ENTMCNC: 32.9 GM/DL — SIGNIFICANT CHANGE UP (ref 32–36)
MCV RBC AUTO: 89.3 FL — SIGNIFICANT CHANGE UP (ref 80–100)
MONOCYTES # BLD AUTO: 0.78 K/UL — SIGNIFICANT CHANGE UP (ref 0–0.9)
MONOCYTES NFR BLD AUTO: 9.7 % — SIGNIFICANT CHANGE UP (ref 2–14)
NEUTROPHILS # BLD AUTO: 3.86 K/UL — SIGNIFICANT CHANGE UP (ref 1.8–7.4)
NEUTROPHILS NFR BLD AUTO: 47.7 % — SIGNIFICANT CHANGE UP (ref 43–77)
PLATELET # BLD AUTO: 206 K/UL — SIGNIFICANT CHANGE UP (ref 150–400)
POTASSIUM SERPL-MCNC: 3.5 MMOL/L — SIGNIFICANT CHANGE UP (ref 3.5–5.3)
POTASSIUM SERPL-SCNC: 3.5 MMOL/L — SIGNIFICANT CHANGE UP (ref 3.5–5.3)
PROT SERPL-MCNC: 6.5 G/DL — SIGNIFICANT CHANGE UP (ref 6–8.3)
PROTHROM AB SERPL-ACNC: 11.8 SEC — SIGNIFICANT CHANGE UP (ref 10–13.1)
RBC # BLD: 4.87 M/UL — SIGNIFICANT CHANGE UP (ref 4.2–5.8)
RBC # FLD: 12.2 % — SIGNIFICANT CHANGE UP (ref 10.3–14.5)
SODIUM SERPL-SCNC: 138 MMOL/L — SIGNIFICANT CHANGE UP (ref 135–145)
WBC # BLD: 8.08 K/UL — SIGNIFICANT CHANGE UP (ref 3.8–10.5)
WBC # FLD AUTO: 8.08 K/UL — SIGNIFICANT CHANGE UP (ref 3.8–10.5)

## 2019-06-25 RX ADMIN — SODIUM CHLORIDE 125 MILLILITER(S): 9 INJECTION, SOLUTION INTRAVENOUS at 01:03

## 2019-06-25 RX ADMIN — SODIUM CHLORIDE 125 MILLILITER(S): 9 INJECTION, SOLUTION INTRAVENOUS at 08:03

## 2019-06-25 RX ADMIN — Medication 100 GRAM(S): at 20:12

## 2019-06-25 RX ADMIN — ENOXAPARIN SODIUM 40 MILLIGRAM(S): 100 INJECTION SUBCUTANEOUS at 11:49

## 2019-06-25 RX ADMIN — Medication 100 GRAM(S): at 08:03

## 2019-06-25 NOTE — PROGRESS NOTE ADULT - SUBJECTIVE AND OBJECTIVE BOX
Surgery Progress Note    S: Patient seen and examined. No acute events overnight. Pain well controlled. Tolerating CLD.     O:  Vital Signs Last 24 Hrs  T(C): 36.4 (25 Jun 2019 05:10), Max: 36.8 (24 Jun 2019 16:17)  T(F): 97.5 (25 Jun 2019 05:10), Max: 98.3 (24 Jun 2019 16:17)  HR: 65 (25 Jun 2019 05:10) (64 - 78)  BP: 132/72 (25 Jun 2019 05:10) (125/78 - 159/68)  BP(mean): --  RR: 18 (25 Jun 2019 05:10) (16 - 18)  SpO2: 97% (25 Jun 2019 05:10) (95% - 98%)    I&O's Detail    24 Jun 2019 07:01  -  25 Jun 2019 07:00  --------------------------------------------------------  IN:    IV PiggyBack: 100 mL    lactated ringers.: 1500 mL    Oral Fluid: 700 mL  Total IN: 2300 mL    OUT:    Voided: 1550 mL  Total OUT: 1550 mL    Total NET: 750 mL      25 Jun 2019 07:01  -  25 Jun 2019 10:23  --------------------------------------------------------  IN:    lactated ringers.: 250 mL    Oral Fluid: 240 mL  Total IN: 490 mL    OUT:  Total OUT: 0 mL    Total NET: 490 mL          MEDICATIONS  (STANDING):  cefoTEtan  IVPB 2 Gram(s) IV Intermittent every 12 hours  enoxaparin Injectable 40 milliGRAM(s) SubCutaneous daily  lactated ringers. 1000 milliLiter(s) (125 mL/Hr) IV Continuous <Continuous>    MEDICATIONS  (PRN):  HYDROmorphone  Injectable 0.5 milliGRAM(s) IV Push every 4 hours PRN Breakthrough pain if tylenol doesn't control  ondansetron Injectable 4 milliGRAM(s) IV Push every 6 hours PRN Nausea                            16.1   13.6  )-----------( 235      ( 24 Jun 2019 05:32 )             48.8       06-25    138  |  101  |  7   ----------------------------<  101<H>  3.5   |  26  |  0.72    Ca    8.6      25 Jun 2019 06:56    TPro  6.5  /  Alb  3.7  /  TBili  0.7  /  DBili  x   /  AST  17  /  ALT  20  /  AlkPhos  48  06-25      Physical Exam:  Gen: Laying in bed, NAD  Resp: Unlabored breathing  Abd: soft, ND, TTP in RUQ   Ext: WWP  Skin: No rashes

## 2019-06-25 NOTE — PRE-ANESTHESIA EVALUATION ADULT - NSANTHLABRESULTSFT_GEN_ALL_CORE
US of abdomen:  2 cm x 7 mm gallstone distal neck region. Borderline   	thickening of the gallbladder wall. There is no pericholecystic fluid.

## 2019-06-25 NOTE — PRE-ANESTHESIA EVALUATION ADULT - NSANTHPMHFT_GEN_ALL_CORE
44M no pmh/psh presents with 1 day of RUQ pain. The pain awoke him from sleep at 3am, has been in the RUQ, constant with intermittent increases in severity. Patient had one associated episode of emesis driving to the hospital. Patient initially had the pain in 2017 and had extensive work-up. Multiple negative ultrasound, endoscopies (9/17), and Clinic/ED visits (2/26/18, 4/12/19, 5/26/19, 6/3/19).

## 2019-06-25 NOTE — PROGRESS NOTE ADULT - ASSESSMENT
Patient is a 44M no pmh/psh presents with 1 day of RUQ pain.   Patient initially had the pain in 2017 and had extensive work-up. Multiple ultrasounds showing only sludge, a negative HIDA '17, negative endoscopies (9/17), and Clinic/ED visits (2/26/18, 4/12/19, 5/26/19, 6/3/19).   Most recent U/s shows a 2x7cm in the gallbladder neck and patient is tender in the RUQ.    - Cefotetan for IV antibiosis  - Pain and nausea control  - CLD  - NPO at midnight   - encourage OOB   OR roberto      ATP  [id number]

## 2019-06-26 ENCOUNTER — TRANSCRIPTION ENCOUNTER (OUTPATIENT)
Age: 44
End: 2019-06-26

## 2019-06-26 ENCOUNTER — RESULT REVIEW (OUTPATIENT)
Age: 44
End: 2019-06-26

## 2019-06-26 VITALS
HEART RATE: 92 BPM | TEMPERATURE: 98 F | OXYGEN SATURATION: 94 % | RESPIRATION RATE: 18 BRPM | DIASTOLIC BLOOD PRESSURE: 83 MMHG | SYSTOLIC BLOOD PRESSURE: 144 MMHG

## 2019-06-26 LAB
ANION GAP SERPL CALC-SCNC: 13 MMOL/L — SIGNIFICANT CHANGE UP (ref 5–17)
BLD GP AB SCN SERPL QL: NEGATIVE — SIGNIFICANT CHANGE UP
BUN SERPL-MCNC: 7 MG/DL — SIGNIFICANT CHANGE UP (ref 7–23)
CALCIUM SERPL-MCNC: 9.1 MG/DL — SIGNIFICANT CHANGE UP (ref 8.4–10.5)
CHLORIDE SERPL-SCNC: 100 MMOL/L — SIGNIFICANT CHANGE UP (ref 96–108)
CO2 SERPL-SCNC: 28 MMOL/L — SIGNIFICANT CHANGE UP (ref 22–31)
CREAT SERPL-MCNC: 0.75 MG/DL — SIGNIFICANT CHANGE UP (ref 0.5–1.3)
GLUCOSE SERPL-MCNC: 86 MG/DL — SIGNIFICANT CHANGE UP (ref 70–99)
HCT VFR BLD CALC: 46.7 % — SIGNIFICANT CHANGE UP (ref 39–50)
HGB BLD-MCNC: 15.4 G/DL — SIGNIFICANT CHANGE UP (ref 13–17)
MAGNESIUM SERPL-MCNC: 2 MG/DL — SIGNIFICANT CHANGE UP (ref 1.6–2.6)
MCHC RBC-ENTMCNC: 29.6 PG — SIGNIFICANT CHANGE UP (ref 27–34)
MCHC RBC-ENTMCNC: 32.9 GM/DL — SIGNIFICANT CHANGE UP (ref 32–36)
MCV RBC AUTO: 89.8 FL — SIGNIFICANT CHANGE UP (ref 80–100)
PHOSPHATE SERPL-MCNC: 3.2 MG/DL — SIGNIFICANT CHANGE UP (ref 2.5–4.5)
PLATELET # BLD AUTO: 200 K/UL — SIGNIFICANT CHANGE UP (ref 150–400)
POTASSIUM SERPL-MCNC: 3.5 MMOL/L — SIGNIFICANT CHANGE UP (ref 3.5–5.3)
POTASSIUM SERPL-SCNC: 3.5 MMOL/L — SIGNIFICANT CHANGE UP (ref 3.5–5.3)
RBC # BLD: 5.2 M/UL — SIGNIFICANT CHANGE UP (ref 4.2–5.8)
RBC # FLD: 11.7 % — SIGNIFICANT CHANGE UP (ref 10.3–14.5)
RH IG SCN BLD-IMP: POSITIVE — SIGNIFICANT CHANGE UP
SODIUM SERPL-SCNC: 141 MMOL/L — SIGNIFICANT CHANGE UP (ref 135–145)
WBC # BLD: 8.8 K/UL — SIGNIFICANT CHANGE UP (ref 3.8–10.5)
WBC # FLD AUTO: 8.8 K/UL — SIGNIFICANT CHANGE UP (ref 3.8–10.5)

## 2019-06-26 PROCEDURE — 47562 LAPAROSCOPIC CHOLECYSTECTOMY: CPT

## 2019-06-26 PROCEDURE — 88304 TISSUE EXAM BY PATHOLOGIST: CPT | Mod: 26

## 2019-06-26 RX ORDER — ONDANSETRON 8 MG/1
4 TABLET, FILM COATED ORAL ONCE
Refills: 0 | Status: DISCONTINUED | OUTPATIENT
Start: 2019-06-26 | End: 2019-06-26

## 2019-06-26 RX ORDER — ACETAMINOPHEN 500 MG
975 TABLET ORAL EVERY 6 HOURS
Refills: 0 | Status: DISCONTINUED | OUTPATIENT
Start: 2019-06-26 | End: 2019-06-26

## 2019-06-26 RX ORDER — CHLORHEXIDINE GLUCONATE 213 G/1000ML
1 SOLUTION TOPICAL DAILY
Refills: 0 | Status: DISCONTINUED | OUTPATIENT
Start: 2019-06-26 | End: 2019-06-26

## 2019-06-26 RX ORDER — OXYCODONE HYDROCHLORIDE 5 MG/1
1 TABLET ORAL
Qty: 10 | Refills: 0
Start: 2019-06-26

## 2019-06-26 RX ORDER — HYDROMORPHONE HYDROCHLORIDE 2 MG/ML
0.5 INJECTION INTRAMUSCULAR; INTRAVENOUS; SUBCUTANEOUS
Refills: 0 | Status: DISCONTINUED | OUTPATIENT
Start: 2019-06-26 | End: 2019-06-26

## 2019-06-26 RX ADMIN — Medication 975 MILLIGRAM(S): at 18:26

## 2019-06-26 RX ADMIN — Medication 975 MILLIGRAM(S): at 18:56

## 2019-06-26 RX ADMIN — ENOXAPARIN SODIUM 40 MILLIGRAM(S): 100 INJECTION SUBCUTANEOUS at 17:34

## 2019-06-26 NOTE — DISCHARGE NOTE PROVIDER - NSDCCPCAREPLAN_GEN_ALL_CORE_FT
PRINCIPAL DISCHARGE DIAGNOSIS  Diagnosis: Acute cholecystitis  Assessment and Plan of Treatment: S/p Laparoscopic Cholecystectomy

## 2019-06-26 NOTE — DISCHARGE NOTE PROVIDER - NSDCFUADDINST_GEN_ALL_CORE_FT
WOUND CARE: Place clean dry gauze on your incision, and change daily. Your bandage may have small noted areas of blood on your dressing this is normal. If your dressing continues to ooze and saturate please notify your surgeon.  Steri-strips have been applied to your incisions.  Do not remove these.  They will fall off as they get wet; in approximately 7-10 days.    BATHING: Please do not submerge wound underwater. You may shower and/or sponge bathe.  ACTIVITY: Do not lift anything heavier than 10 lbs, which is about a gallon of milk. No Strenuous activity for about 6 weeks, and until cleared by your surgeon. Strenuous activity includes yoga, weight lifting, and cycling. Stairs and walking are ok. Otherwise, you may return to your usual level of physical activity. If you are taking narcotic pain medication (such as Percocet), do NOT drive a car, operate machinery or make important decisions.  DIET: Return to your usual diet.  NOTIFY YOUR SURGEON IF: You have any bleeding that does not stop, any pus draining from your wound, any fever (over 100.4 F) or chills, persistent nausea/vomiting, persistent diarrhea, or if your pain is not controlled on your discharge pain medications.  FOLLOW-UP:  1. Please call to make a follow-up appointment within one week of discharge with Dr. Garcia.   2. Please follow up with your primary care physician in one week regarding your hospitalization. WOUND CARE: Place clean dry gauze on your incision, and change daily. Your bandage may have small noted areas of blood on your dressing this is normal. If your dressing continues to ooze and saturate please notify your surgeon.  Steri-strips have been applied to your incisions.  Do not remove these.  They will fall off as they get wet; in approximately 7-10 days.    BATHING: Please do not submerge wound underwater. You may shower and/or sponge bathe.  ACTIVITY: Do not lift anything heavier than 10 lbs, which is about a gallon of milk. No Strenuous activity for about 6 weeks, and until cleared by your surgeon. Strenuous activity includes yoga, weight lifting, and cycling. Stairs and walking are ok. Otherwise, you may return to your usual level of physical activity. If you are taking narcotic pain medication (such as Percocet), do NOT drive a car, operate machinery or make important decisions.  DIET: Return to your usual diet.  NOTIFY YOUR SURGEON IF: You have any bleeding that does not stop, any pus draining from your wound, any fever (over 100.4 F) or chills, persistent nausea/vomiting, persistent diarrhea, or if your pain is not controlled on your discharge pain medications.  FOLLOW-UP:  1. Please call to make a follow-up appointment within one week of discharge with Dr. Garcia.   2. Please follow up with your primary care physician in one week regarding your hospitalization.    **If you are taking Tylenol do not combine with Endocet. There is a Tylenol component in the combination of Endocet, and it is pertinent to not obtain more than 2,000 grams of Tylenol daily.

## 2019-06-26 NOTE — BRIEF OPERATIVE NOTE - OPERATION/FINDINGS
Distended gallbladder. Cystic duct and cystic artery identified and divided with hem-o-lock clips. Distended gallbladder with bile spillage during dissection. Cystic duct and cystic artery identified and divided with hem-o-lock clips. Surgicel placed in gallbladder fossa. Hemostatic at the end of the case.

## 2019-06-26 NOTE — DISCHARGE NOTE PROVIDER - CONDITIONS AT DISCHARGE
Seen and examined by Attending Physician Dr. Garcia who deemed, and cleared patient stable for discharge.

## 2019-06-26 NOTE — DISCHARGE NOTE PROVIDER - CARE PROVIDER_API CALL
Bri Garcia (MD)  Surgery; Surgical Critical Care  [address], [address] [address]  Phone: [phone]  Fax: [phone]  Follow Up Time: 1 week

## 2019-06-26 NOTE — DISCHARGE NOTE NURSING/CASE MANAGEMENT/SOCIAL WORK - NSDCPNINST_GEN_ALL_CORE
If unable to tolerate diet, nausea, vomiting, fever above 100.3, chills, or an increase in pain, notify provider or return to ER

## 2019-06-26 NOTE — DISCHARGE NOTE PROVIDER - HOSPITAL COURSE
Patient is a 44 year old male with PMHx/PSHx who presents with a 1 day of RUQ pain that awoke him from his sleep.  Patient reports pain in the RUQ, was constant with intermittent increases in severity. Patient had one associated episode of while emesis driving to the hospital. Patient initially reports he had the pain in 2017, and had an extensive work-up, with Multiple negative ultrasound, endoscopies (9/17), and Clinic/ED visits (2/26/18, 4/12/19, 5/26/19, 6/3/19).         Patient was admitted, and an ultrasound was performed: Impression: 2 cm x 7 mm gallstone distal neck region. Borderline thickening of the gallbladder wall. There is no pericholecystic fluid. Cannot assess for sonographic Beltrán sign as patient was given pain medication. Mild diffuse increased echogenicity of the liver suggests diffuse hepatic steatosis.        Patient was placed on IV zosyn, and was taken to the operating room for a noncomplicated Laparoscopic Cholecystectomy. Post operatively patient was taken to the PACU and was given a diet and advanced as tolerated. Patient was given a pain regimen with PO pain control.

## 2019-06-26 NOTE — DISCHARGE NOTE PROVIDER - NSDCCPTREATMENT_GEN_ALL_CORE_FT
PRINCIPAL PROCEDURE  Procedure: Cholecystectomy  Findings and Treatment: S/p Laparoscopic Cholecystectomy

## 2019-06-26 NOTE — PROGRESS NOTE ADULT - SUBJECTIVE AND OBJECTIVE BOX
GENERAL SURGERY DAILY PROGRESS NOTE:     Subjective: Patient seen and examined. Patient stable with no overnight events. Patient denies CP/ SOB/ Palpatations. Patient denies N/V. Reports No flatus and No BM.     MEDICATIONS  (STANDING):  chlorhexidine 2% Cloths 1 Application(s) Topical daily  enoxaparin Injectable 40 milliGRAM(s) SubCutaneous daily  lactated ringers. 1000 milliLiter(s) (125 mL/Hr) IV Continuous <Continuous>    MEDICATIONS  (PRN):  HYDROmorphone  Injectable 0.5 milliGRAM(s) IV Push every 4 hours PRN Breakthrough pain if tylenol doesn't control  ondansetron Injectable 4 milliGRAM(s) IV Push every 6 hours PRN Nausea      Vital Signs Last 24 Hrs  T(C): 36.9 (26 Jun 2019 15:03), Max: 36.9 (26 Jun 2019 15:03)  T(F): 98.5 (26 Jun 2019 15:03), Max: 98.5 (26 Jun 2019 15:03)  HR: 90 (26 Jun 2019 15:03) (61 - 104)  BP: 111/73 (26 Jun 2019 15:03) (107/58 - 144/92)  BP(mean): 85 (26 Jun 2019 14:30) (78 - 87)  RR: 17 (26 Jun 2019 15:03) (12 - 18)  SpO2: 92% (26 Jun 2019 15:03) (92% - 97%)    I&O's Detail    25 Jun 2019 07:01  -  26 Jun 2019 07:00  --------------------------------------------------------  IN:    IV PiggyBack: 100 mL    lactated ringers.: 2875 mL    Oral Fluid: 960 mL  Total IN: 3935 mL    OUT:    Voided: 1350 mL  Total OUT: 1350 mL    Total NET: 2585 mL      26 Jun 2019 07:01  -  26 Jun 2019 16:15  --------------------------------------------------------  IN:    lactated ringers.: 125 mL  Total IN: 125 mL    OUT:    Voided: 800 mL  Total OUT: 800 mL    Total NET: -675 mL          Daily Height in cm: 172.72 (26 Jun 2019 06:13)    Daily     LABS:                        15.4   8.8   )-----------( 200      ( 26 Jun 2019 07:07 )             46.7     06-26    141  |  100  |  7   ----------------------------<  86  3.5   |  28  |  0.75    Ca    9.1      26 Jun 2019 07:06  Phos  3.2     06-26  Mg     2.0     06-26    TPro  6.5  /  Alb  3.7  /  TBili  0.7  /  DBili  x   /  AST  17  /  ALT  20  /  AlkPhos  48  06-25    PT/INR - ( 25 Jun 2019 09:22 )   PT: 11.8 sec;   INR: 1.03 ratio         PTT - ( 25 Jun 2019 09:22 )  PTT:29.7 sec      Physical Exam:   Gen: NAD, AAOx3  Abdomen: soft, Non-tender, non distended       Assessment:   44y Male with Acute cholecystitis    Plan:  -DVT PPX  -Pain control   -OOB   -IV zosyn   -NPO awaiting OR for laparoscopic Cholecystectomy   -Await Gi Fxn   -Operative Planning     Ruth Partida   #[id number] 06-26-19 @ 7am

## 2019-06-26 NOTE — DISCHARGE NOTE NURSING/CASE MANAGEMENT/SOCIAL WORK - NSDCDPATPORTLINK_GEN_ALL_CORE
You can access the [facility] Patient Portal, offered by [facility], by registering with the following website: [email or web address]

## 2019-07-09 LAB — SURGICAL PATHOLOGY STUDY: SIGNIFICANT CHANGE UP

## 2019-07-10 ENCOUNTER — APPOINTMENT (OUTPATIENT)
Dept: TRAUMA SURGERY | Facility: CLINIC | Age: 44
End: 2019-07-10
Payer: SELF-PAY

## 2019-07-10 VITALS
TEMPERATURE: 98.1 F | WEIGHT: 181 LBS | HEIGHT: 65 IN | SYSTOLIC BLOOD PRESSURE: 138 MMHG | DIASTOLIC BLOOD PRESSURE: 94 MMHG | BODY MASS INDEX: 30.16 KG/M2 | HEART RATE: 64 BPM

## 2019-07-10 PROCEDURE — 86901 BLOOD TYPING SEROLOGIC RH(D): CPT

## 2019-07-10 PROCEDURE — 80053 COMPREHEN METABOLIC PANEL: CPT

## 2019-07-10 PROCEDURE — 80048 BASIC METABOLIC PNL TOTAL CA: CPT

## 2019-07-10 PROCEDURE — 83735 ASSAY OF MAGNESIUM: CPT

## 2019-07-10 PROCEDURE — 84100 ASSAY OF PHOSPHORUS: CPT

## 2019-07-10 PROCEDURE — 85027 COMPLETE CBC AUTOMATED: CPT

## 2019-07-10 PROCEDURE — 82803 BLOOD GASES ANY COMBINATION: CPT

## 2019-07-10 PROCEDURE — 88304 TISSUE EXAM BY PATHOLOGIST: CPT

## 2019-07-10 PROCEDURE — 85610 PROTHROMBIN TIME: CPT

## 2019-07-10 PROCEDURE — 86850 RBC ANTIBODY SCREEN: CPT

## 2019-07-10 PROCEDURE — 85730 THROMBOPLASTIN TIME PARTIAL: CPT

## 2019-07-10 PROCEDURE — 84295 ASSAY OF SERUM SODIUM: CPT

## 2019-07-10 PROCEDURE — 83605 ASSAY OF LACTIC ACID: CPT

## 2019-07-10 PROCEDURE — 96375 TX/PRO/DX INJ NEW DRUG ADDON: CPT

## 2019-07-10 PROCEDURE — 84132 ASSAY OF SERUM POTASSIUM: CPT

## 2019-07-10 PROCEDURE — 83690 ASSAY OF LIPASE: CPT

## 2019-07-10 PROCEDURE — 82435 ASSAY OF BLOOD CHLORIDE: CPT

## 2019-07-10 PROCEDURE — 82330 ASSAY OF CALCIUM: CPT

## 2019-07-10 PROCEDURE — 82947 ASSAY GLUCOSE BLOOD QUANT: CPT

## 2019-07-10 PROCEDURE — 96361 HYDRATE IV INFUSION ADD-ON: CPT

## 2019-07-10 PROCEDURE — 76705 ECHO EXAM OF ABDOMEN: CPT

## 2019-07-10 PROCEDURE — 85014 HEMATOCRIT: CPT

## 2019-07-10 PROCEDURE — C1889: CPT

## 2019-07-10 PROCEDURE — 86900 BLOOD TYPING SEROLOGIC ABO: CPT

## 2019-07-10 PROCEDURE — 96374 THER/PROPH/DIAG INJ IV PUSH: CPT

## 2019-07-10 PROCEDURE — 99285 EMERGENCY DEPT VISIT HI MDM: CPT | Mod: 25

## 2019-07-10 PROCEDURE — 99024 POSTOP FOLLOW-UP VISIT: CPT

## 2021-06-18 ENCOUNTER — APPOINTMENT (OUTPATIENT)
Age: 46
End: 2021-06-18
Payer: MEDICAID

## 2021-06-18 PROCEDURE — 0011A: CPT

## 2021-07-16 ENCOUNTER — APPOINTMENT (OUTPATIENT)
Age: 46
End: 2021-07-16
Payer: MEDICAID

## 2021-07-16 PROCEDURE — 0012A: CPT

## 2021-12-14 NOTE — ED PROVIDER NOTE - NS ED MD EM SELECTION
[address]35 Comprehensive Nasal Turnover Hinge Flap Text: The defect edges were debeveled with a #15 scalpel blade.  Given the size, depth, location of the defect and the defect being full thickness a nasal turnover hinge flap was deemed most appropriate.  Using a sterile surgical marker, an appropriate hinge flap was drawn incorporating the defect. The area thus outlined was incised with a #15 scalpel blade. The flap was designed to recreate the nasal mucosal lining and the alar rim. The skin margins were undermined to an appropriate distance in all directions utilizing iris scissors.

## 2022-06-24 NOTE — BRIEF OPERATIVE NOTE - ASSISTANT(S)
PT DAILY TREATMENT NOTE     Patient Name: Lino Henry  Date:2022  : 1987  [x]  Patient  Verified  Payor: Sylvester Lu / Plan: [employer] / Product Type: HMO /    In time:826  Out time:855  Total Treatment Time (min): 29  Visit #: 3 of     Medicare/BCBS Only   Total Timed Codes (min):  29 1:1 Treatment Time:  29       Treatment Area: Other low back pain [M54.59]  Right leg pain [M79.604]    SUBJECTIVE  Pain Level (0-10 scale): 1/10  Any medication changes, allergies to medications, adverse drug reactions, diagnosis change, or new procedure performed?: [x] No    [] Yes (see summary sheet for update)  Subjective functional status/changes:   [] No changes reported  Pt stated that he has mild pain right now, but the pain cont to go up to 4/ with certain movements    OBJECTIVE    29 min Therapeutic Exercise:  [x] See flow sheet :   Rationale: increase ROM and increase strength to improve the patients ability to increase ease with ADLs    With   [x] TE   [] TA   [] neuro   [] other: Patient Education: [x] Review HEP    [] Progressed/Changed HEP based on:   [] positioning   [] body mechanics   [] transfers   [] heat/ice application    [] other:      Other Objective/Functional Measures:   11 minutes late for session  Would not reduce effort as requested to prevent increased pain with exercises  Had increased pain in the right hip and low back with all exercises  Was pleased with supine piriformis and michelle stretches     Pain Level (0-10 scale) post treatment: 4/10    ASSESSMENT/Changes in Function:   Pt is making slow progress toward goals. Pt cont with significant pain in the right hip and low back with most movements. Cont to have difficulty with performing most ADLs and household chores. Cont with decreased core strength.      Patient will continue to benefit from skilled PT services to modify and progress therapeutic interventions, address functional mobility deficits, address JALYN Jacob PGY-II ROM deficits, address strength deficits, analyze and cue movement patterns, analyze and modify body mechanics/ergonomics, assess and modify postural abnormalities, address imbalance/dizziness and instruct in home and community integration to attain remaining goals. []  See Plan of Care  []  See progress note/recertification  []  See Discharge Summary         Progress towards goals / Updated goals:  Short term goals: To be accomplished within 1 week  1. Pt will be independent with HEP to maintain progression. Eval status: given and reviewed HEP  Current: partial compliance (6/20/22)     Long term goals: To be accomplished within 4 weeks  1. Pt will improve FOTO score by 15 points to 73/100 to show improvement with functional mobility performance. Eval status: 58                2. Pt will report at least 50% improvement with pain & numbness symptoms to improve his QOL. Eval status: 1-10/10 pain, worst with heavy lifting and prolonged walking                3. Pt will report No difficulty with performing usual work & housework to improve his QOL. Eval status: Moderate difficulty     4. Pt will demonstrate appropriate lifting mechanics to improve tolerance & safety with job duties.   Eval status: good understanding after education, will review next visits    PLAN  []  Upgrade activities as tolerated     [x]  Continue plan of care  []  Update interventions per flow sheet       []  Discharge due to:_  []  Other:_      Jeane Cameron PTA 6/24/2022  7:23 AM    Future Appointments   Date Time Provider Tono Evans   6/24/2022  8:15 AM Iesha Panda PTA [facility]   6/27/2022  3:00 PM Joan Brown PTA [facility]   6/29/2022  6:00 PM Filbert Carrier [facility]   7/1/2022  3:00 PM Filbert Carrier [facility]   7/7/2022  3:00 PM Chin Aquino, PT [facility]   7/8/2022  3:00 PM Chin Aquino, PT [facility]   7/11/2022  3:45 PM Kandee Angeles, Colleen Human, PTA [facility] 7/13/2022  3:45 PM Tri Lara, PTA [facility]   7/15/2022  3:45 PM Loren Cruz, PTA [facility]

## 2022-12-02 NOTE — PACU DISCHARGE NOTE - NSPTMEETSDISCHCRITERIADT_GEN_A_CORE
"Goal Outcome Evaluation:    PRIMARY DIAGNOSIS: \"GENERIC\" NURSING  OUTPATIENT/OBSERVATION GOALS TO BE MET BEFORE DISCHARGE:  ADLs back to baseline: Yes    Activity and level of assistance: A1 with GB    Pain status: Pain free.    Return to near baseline physical activity: Yes     Discharge Planner Nurse   Safe discharge environment identified: No, new GH placement  Barriers to discharge: Yes       Entered by: Daphne Pacheco RN 12/02/2022 6:45 AM     Please review provider order for any additional goals.   Nurse to notify provider when observation goals have been met and patient is ready for discharge.  " 26-Jun-2019 15:16

## 2023-04-06 NOTE — ED PROVIDER NOTE - EYES, MLM
Clear bilaterally, pupils equal, round and reactive to light. all other ROS negative except as per HPI

## 2024-04-05 ENCOUNTER — EMERGENCY (EMERGENCY)
Facility: HOSPITAL | Age: 49
LOS: 1 days | Discharge: HOME | End: 2024-04-05
Attending: STUDENT IN AN ORGANIZED HEALTH CARE EDUCATION/TRAINING PROGRAM | Admitting: STUDENT IN AN ORGANIZED HEALTH CARE EDUCATION/TRAINING PROGRAM
Payer: SELF-PAY

## 2024-04-05 VITALS
SYSTOLIC BLOOD PRESSURE: 185 MMHG | HEIGHT: 66 IN | HEART RATE: 74 BPM | RESPIRATION RATE: 20 BRPM | DIASTOLIC BLOOD PRESSURE: 125 MMHG | WEIGHT: 151.9 LBS | OXYGEN SATURATION: 99 % | TEMPERATURE: 98 F

## 2024-04-05 LAB
ALBUMIN SERPL ELPH-MCNC: 4 G/DL — SIGNIFICANT CHANGE UP (ref 3.3–5)
ALP SERPL-CCNC: 70 U/L — SIGNIFICANT CHANGE UP (ref 30–120)
ALT FLD-CCNC: 109 U/L — HIGH (ref 10–60)
ANION GAP SERPL CALC-SCNC: 7 MMOL/L — SIGNIFICANT CHANGE UP (ref 5–17)
APTT BLD: 32.7 SEC — SIGNIFICANT CHANGE UP (ref 24.5–35.6)
AST SERPL-CCNC: 47 U/L — HIGH (ref 10–40)
BASOPHILS # BLD AUTO: 0.07 K/UL — SIGNIFICANT CHANGE UP (ref 0–0.2)
BASOPHILS NFR BLD AUTO: 0.6 % — SIGNIFICANT CHANGE UP (ref 0–2)
BILIRUB SERPL-MCNC: 0.5 MG/DL — SIGNIFICANT CHANGE UP (ref 0.2–1.2)
BUN SERPL-MCNC: 12 MG/DL — SIGNIFICANT CHANGE UP (ref 7–23)
CALCIUM SERPL-MCNC: 8.7 MG/DL — SIGNIFICANT CHANGE UP (ref 8.4–10.5)
CHLORIDE SERPL-SCNC: 103 MMOL/L — SIGNIFICANT CHANGE UP (ref 96–108)
CO2 SERPL-SCNC: 28 MMOL/L — SIGNIFICANT CHANGE UP (ref 22–31)
CREAT SERPL-MCNC: 0.94 MG/DL — SIGNIFICANT CHANGE UP (ref 0.5–1.3)
EGFR: 100 ML/MIN/1.73M2 — SIGNIFICANT CHANGE UP
EOSINOPHIL # BLD AUTO: 0.42 K/UL — SIGNIFICANT CHANGE UP (ref 0–0.5)
EOSINOPHIL NFR BLD AUTO: 3.4 % — SIGNIFICANT CHANGE UP (ref 0–6)
GLUCOSE SERPL-MCNC: 133 MG/DL — HIGH (ref 70–99)
HCT VFR BLD CALC: 46.1 % — SIGNIFICANT CHANGE UP (ref 39–50)
HGB BLD-MCNC: 15.3 G/DL — SIGNIFICANT CHANGE UP (ref 13–17)
IMM GRANULOCYTES NFR BLD AUTO: 0.4 % — SIGNIFICANT CHANGE UP (ref 0–0.9)
INR BLD: 1.01 RATIO — SIGNIFICANT CHANGE UP (ref 0.85–1.18)
LACTATE SERPL-SCNC: 1.1 MMOL/L — SIGNIFICANT CHANGE UP (ref 0.7–2)
LYMPHOCYTES # BLD AUTO: 47.6 % — HIGH (ref 13–44)
LYMPHOCYTES # BLD AUTO: 5.82 K/UL — HIGH (ref 1–3.3)
MCHC RBC-ENTMCNC: 28.6 PG — SIGNIFICANT CHANGE UP (ref 27–34)
MCHC RBC-ENTMCNC: 33.2 GM/DL — SIGNIFICANT CHANGE UP (ref 32–36)
MCV RBC AUTO: 86.2 FL — SIGNIFICANT CHANGE UP (ref 80–100)
MONOCYTES # BLD AUTO: 1.17 K/UL — HIGH (ref 0–0.9)
MONOCYTES NFR BLD AUTO: 9.6 % — SIGNIFICANT CHANGE UP (ref 2–14)
NEUTROPHILS # BLD AUTO: 4.69 K/UL — SIGNIFICANT CHANGE UP (ref 1.8–7.4)
NEUTROPHILS NFR BLD AUTO: 38.4 % — LOW (ref 43–77)
NRBC # BLD: 0 /100 WBCS — SIGNIFICANT CHANGE UP (ref 0–0)
PLATELET # BLD AUTO: 249 K/UL — SIGNIFICANT CHANGE UP (ref 150–400)
POTASSIUM SERPL-MCNC: 3.4 MMOL/L — LOW (ref 3.5–5.3)
POTASSIUM SERPL-SCNC: 3.4 MMOL/L — LOW (ref 3.5–5.3)
PROT SERPL-MCNC: 8 G/DL — SIGNIFICANT CHANGE UP (ref 6–8.3)
PROTHROM AB SERPL-ACNC: 11 SEC — SIGNIFICANT CHANGE UP (ref 9.5–13)
RBC # BLD: 5.35 M/UL — SIGNIFICANT CHANGE UP (ref 4.2–5.8)
RBC # FLD: 12.3 % — SIGNIFICANT CHANGE UP (ref 10.3–14.5)
SODIUM SERPL-SCNC: 138 MMOL/L — SIGNIFICANT CHANGE UP (ref 135–145)
WBC # BLD: 12.22 K/UL — HIGH (ref 3.8–10.5)
WBC # FLD AUTO: 12.22 K/UL — HIGH (ref 3.8–10.5)

## 2024-04-05 PROCEDURE — 99285 EMERGENCY DEPT VISIT HI MDM: CPT

## 2024-04-05 PROCEDURE — 93010 ELECTROCARDIOGRAM REPORT: CPT

## 2024-04-05 PROCEDURE — 73130 X-RAY EXAM OF HAND: CPT | Mod: 26,LT

## 2024-04-05 PROCEDURE — 71045 X-RAY EXAM CHEST 1 VIEW: CPT | Mod: 26

## 2024-04-05 RX ORDER — TETANUS TOXOID, REDUCED DIPHTHERIA TOXOID AND ACELLULAR PERTUSSIS VACCINE, ADSORBED 5; 2.5; 8; 8; 2.5 [IU]/.5ML; [IU]/.5ML; UG/.5ML; UG/.5ML; UG/.5ML
0.5 SUSPENSION INTRAMUSCULAR ONCE
Refills: 0 | Status: COMPLETED | OUTPATIENT
Start: 2024-04-05 | End: 2024-04-05

## 2024-04-05 RX ORDER — VANCOMYCIN HCL 1 G
1000 VIAL (EA) INTRAVENOUS ONCE
Refills: 0 | Status: COMPLETED | OUTPATIENT
Start: 2024-04-05 | End: 2024-04-05

## 2024-04-05 RX ADMIN — TETANUS TOXOID, REDUCED DIPHTHERIA TOXOID AND ACELLULAR PERTUSSIS VACCINE, ADSORBED 0.5 MILLILITER(S): 5; 2.5; 8; 8; 2.5 SUSPENSION INTRAMUSCULAR at 23:17

## 2024-04-05 NOTE — ED ADULT NURSE NOTE - NSICDXPASTMEDICALHX_GEN_ALL_CORE_FT
PAST MEDICAL HISTORY:  Hypertension     No pertinent past medical history     No pertinent past medical history

## 2024-04-05 NOTE — ED ADULT NURSE NOTE - CHPI ED NUR RELIEVING FX
OB/GYN Peds Follow-up Phone Call    We want to make sure you had a great experience while in our care, as we strive to be a top hospital. What are some things you think we did well?  Everyone was wonderful, friendly.  Felt just like family and fun, wonderful staff.    You are a key member of your health care team. It is important that you are involved in your/your yasmine care.  At shift change did your nurses talk about your/your yasmine plan of care at your bedside?  yes    Before you went home, the nurse went over some discharge instructions with you about how to take care of yourself/your child at home and what things to watch out for.  Did you understand these instructions?  yes    We would like to recognize nurses, physicians and other staff if they provided very good care to you/your child.  Is there anyone that stands out that you would like us to recognize?  All were fabulous    We are always looking to improve our service and our hospital.  What suggestions do you have for things we could do better?  None    Do you have any additional questions or concerns at this time?  NO    Spoke with Patient      
immobilization

## 2024-04-05 NOTE — ED ADULT TRIAGE NOTE - NS ED TRIAGE AVPU SCALE
Recommend surgery   Recommend colonoscopy Alert-The patient is alert, awake and responds to voice. The patient is oriented to time, place, and person. The triage nurse is able to obtain subjective information.

## 2024-04-05 NOTE — ED ADULT NURSE NOTE - OBJECTIVE STATEMENT
Pt presents to the ED with c/o left hand pain with radiation up his arm. 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 Pt reports he removed the thorn but has pain and swelling since the incident 3 days ago. Pt denies any fever or chills, finger is warm and swollen, hyper sensitive to palpation.

## 2024-04-05 NOTE — ED ADULT NURSE NOTE - NSFALLUNIVINTERV_ED_ALL_ED
Bed/Stretcher in lowest position, wheels locked, appropriate side rails in place/Call bell, personal items and telephone in reach/Instruct patient to call for assistance before getting out of bed/chair/stretcher/Non-slip footwear applied when patient is off stretcher/[place] to call system/Physically safe environment - no spills, clutter or unnecessary equipment/Purposeful proactive rounding/Room/bathroom lighting operational, light cord in reach

## 2024-04-05 NOTE — ED ADULT TRIAGE NOTE - CHIEF COMPLAINT QUOTE
c/o pain to left hand from thorns while cutting plants on Tuesday. Denies OTC pain relief. Hypertensive at triage.

## 2024-04-05 NOTE — ED ADULT NURSE NOTE - SKIN TEMPERATURE
LEE HOSPITALIST  Progress Note     Lady Diehl Patient Status:  Observation    1940 MRN [id number]   [facility] 3NW-A Attending Angie Anderson MD   Hosp Day # 0 [facility] 60 [facility]     Chief Complaint: Abdominal pain    S: Patient 40 mg Intravenous Q12H       ASSESSMENT / PLAN:     1. GIB, presumed UGIB on DAPT  1. NPO  2. IVF - Lasix being held  3. PPI  4. Monitor H&H  5. EGD today  6. GI on consult  2. Acute blood loss anemia  1. Check iron, ferritin - added  2. Monitor H&H  3.  C warm

## 2024-04-06 VITALS
RESPIRATION RATE: 18 BRPM | HEART RATE: 88 BPM | SYSTOLIC BLOOD PRESSURE: 150 MMHG | DIASTOLIC BLOOD PRESSURE: 90 MMHG | TEMPERATURE: 98 F | OXYGEN SATURATION: 98 %

## 2024-04-06 DIAGNOSIS — Z90.49 ACQUIRED ABSENCE OF OTHER SPECIFIED PARTS OF DIGESTIVE TRACT: Chronic | ICD-10-CM

## 2024-04-06 LAB
RAPID RVP RESULT: SIGNIFICANT CHANGE UP
SARS-COV-2 RNA SPEC QL NAA+PROBE: SIGNIFICANT CHANGE UP

## 2024-04-06 PROCEDURE — 90715 TDAP VACCINE 7 YRS/> IM: CPT

## 2024-04-06 PROCEDURE — 73130 X-RAY EXAM OF HAND: CPT

## 2024-04-06 PROCEDURE — 96374 THER/PROPH/DIAG INJ IV PUSH: CPT

## 2024-04-06 PROCEDURE — 36415 COLL VENOUS BLD VENIPUNCTURE: CPT

## 2024-04-06 PROCEDURE — 83605 ASSAY OF LACTIC ACID: CPT

## 2024-04-06 PROCEDURE — 93005 ELECTROCARDIOGRAM TRACING: CPT

## 2024-04-06 PROCEDURE — T1013: CPT

## 2024-04-06 PROCEDURE — 71045 X-RAY EXAM CHEST 1 VIEW: CPT

## 2024-04-06 PROCEDURE — 85730 THROMBOPLASTIN TIME PARTIAL: CPT

## 2024-04-06 PROCEDURE — 80053 COMPREHEN METABOLIC PANEL: CPT

## 2024-04-06 PROCEDURE — 99285 EMERGENCY DEPT VISIT HI MDM: CPT | Mod: 25

## 2024-04-06 PROCEDURE — 85025 COMPLETE CBC W/AUTO DIFF WBC: CPT

## 2024-04-06 PROCEDURE — 87040 BLOOD CULTURE FOR BACTERIA: CPT

## 2024-04-06 PROCEDURE — 90471 IMMUNIZATION ADMIN: CPT

## 2024-04-06 PROCEDURE — 85610 PROTHROMBIN TIME: CPT

## 2024-04-06 PROCEDURE — 0225U NFCT DS DNA&RNA 21 SARSCOV2: CPT

## 2024-04-06 RX ORDER — POTASSIUM CHLORIDE 20 MEQ
40 PACKET (EA) ORAL ONCE
Refills: 0 | Status: COMPLETED | OUTPATIENT
Start: 2024-04-06 | End: 2024-04-06

## 2024-04-06 RX ADMIN — Medication 40 MILLIEQUIVALENT(S): at 00:16

## 2024-04-06 RX ADMIN — Medication 250 MILLIGRAM(S): at 00:00

## 2024-04-06 NOTE — ED PROVIDER NOTE - CLINICAL SUMMARY MEDICAL DECISION MAKING FREE TEXT BOX
48 year old male p/w left finger radiating to his left hand/forearm  x 3 days.  Patient had a thorn in his finger that he pulled out 3 days ago.  No fever, streaking, redness, numbness.  Check labs, lactate, cultures, x-ray, abx, EKG, consider admission

## 2024-04-06 NOTE — ED PROVIDER NOTE - PATIENT PORTAL LINK FT
You can access the FollowMyHealth Patient Portal offered by [facility] by registering at the following website: [email or web address]. By joining [employer]’s FollowMyHealth portal, you will also be able to view your health information using other applications (apps) compatible with our system.

## 2024-04-06 NOTE — ED PROVIDER NOTE - CARE PROVIDER_API CALL
Cinda John  [facility]  [address], [address]  [place] [address]  Phone: [phone]  Fax: [phone]  Follow Up Time:

## 2024-04-06 NOTE — ED PROVIDER NOTE - DIFFERENTIAL DIAGNOSIS
Differential Diagnosis Ddx includes but not limited to cellulitis, abscess Ddx includes but not limited to cellulitis, abscess, herpetic kun, paronychia, bacterial felon, fracture, osteomyelitis

## 2024-04-06 NOTE — ED PROVIDER NOTE - MDM ORDERS SUBMITTED SELECTION
DISCHARGE SCREENS:  ONBS: Sent , pending   Hearing screen:  pass  CCHD screenin/5 Pass   Immunizations:  hep B  RSV prophylaxis:  Synagis ### or Nirsevimab  ### or not given ###  TFT's: ###  Circumcision: ### (desire, ordered for )   Safe Sleep: ###  CSC (<37wks or Cardiac): ###  WIC Form: ###  PCP/Pediatrician: ### ([facility])  Parent/guardian readiness: CPR [ ]; Home going class [ ]; Nursing education/assessment [ ]; Social Work assessment [ ]   Diet/Nutritional Plan: ; Education completed [ ]  Home Health:  Skilled Nursing [ ]; PT [ ]; OT [ ]; Speech [ ]; Orders completed [ ]  Other Provider referrals (ophthalmology, cardiology, endocrinology, peds surgery):      Imaging Studies/Medications

## 2024-04-06 NOTE — ED PROVIDER NOTE - SKIN, MLM
Skin normal color for race, warm, dry and intact. No evidence of rash.  Left 3rd finger with swelling and +TTP.  No abscess or wound drainage.  No streaking.  +Swelling to dorsal surface of left hand.  No swelling, redness, streaking to left forearm.  Full ROM of LUE, +pulses, normal sensation

## 2024-04-06 NOTE — ED PROVIDER NOTE - PROGRESS NOTE DETAILS
Patient reports significant relief of pain after antibiotics.  Results of work up d/w patient and daughter at the bedside. Copies of all report given.  Return precautions discussed.

## 2024-04-06 NOTE — ED PROVIDER NOTE - OBJECTIVE STATEMENT
48-year-old male with a history of HTN presents with left third finger and left hand pain.  Faroese translation provided using  services ID # [id number].  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  A thorn went into his left third finger and he pulled it out.  The following day he noted significant pain and swelling to his left 3rd finger.  He also reports mild pain to his left forearm and left elbow.  He denies any fever, redness, streaking, numbness, or wound discharge.  He has applied ice to his hand with no relief.  He took no medication for the pain PTA.  Patient states that he was diagnosed with HTN 1 year ago, was given a blood pressure medication for 1 month (cannot recall the name of the medication).  He followed up and was told he did not need to be on blood pressure medication anymore but states that he has not been reassessed for the past 1 year.  Unknown last tetanus. PMD [facility]

## 2024-04-06 NOTE — ED PROVIDER NOTE - NSICDXPASTMEDICALHX_GEN_ALL_CORE_FT
PAST MEDICAL HISTORY:  Hypertension     No pertinent past medical history     No pertinent past medical history      PAST MEDICAL HISTORY:  Hypertension

## 2024-04-06 NOTE — ED PROVIDER NOTE - NSFOLLOWUPINSTRUCTIONS_ED_ALL_ED_FT
Please take the antibiotics as prescribed and follow up with your primary care doctor.  Return to the ER for persistent pain, redness, swelling, fever, red streaks, numbness pain going up your left arm, or any other concerns.

## 2024-04-11 LAB
CULTURE RESULTS: SIGNIFICANT CHANGE UP
CULTURE RESULTS: SIGNIFICANT CHANGE UP
SPECIMEN SOURCE: SIGNIFICANT CHANGE UP
SPECIMEN SOURCE: SIGNIFICANT CHANGE UP

## 2025-05-23 NOTE — ED ADULT TRIAGE NOTE - PAIN RATING/NUMBER SCALE (0-10): REST
Case Management Assessment  Initial Evaluation    Date/Time of Evaluation: 5/23/2025 10:45 AM  Assessment Completed by: Neftaly Shirley RN    If patient is discharged prior to next notation, then this note serves as note for discharge by case management.    Patient Name: Sydney Oquendo                   YOB: 1965  Diagnosis: Enteritis [K52.9]  Intractable abdominal pain [R10.9]                   Date / Time: 5/21/2025 10:08 PM    Patient Admission Status: Observation   Readmission Risk (Low < 19, Mod (19-27), High > 27): No data recorded  Current PCP: Radhika Morales APRN - NP  PCP verified by CM? (P) Yes    Chart Reviewed: Yes      History Provided by: (P) Patient  Patient Orientation: (P) Alert and Oriented, Person, Place, Situation    Patient Cognition: (P) Alert    Hospitalization in the last 30 days (Readmission):  No    If yes, Readmission Assessment in  Navigator will be completed.    Advance Directives:      Code Status: Full Code   Patient's Primary Decision Maker is: (P) Legal Next of Kin      Discharge Planning:    Patient lives with: (P) Spouse/Significant Other, Parent Type of Home: (P) House  Primary Care Giver:    Patient Support Systems include: (P) Spouse/Significant Other, Family Members, Children, Friends/Neighbors   Current Financial resources: (P) Other (Comment) (Commercial insurance.)  Current community resources:    Current services prior to admission: (P) None            Current DME:              Type of Home Care services:  (P) None    ADLS  Prior functional level: (P) Independent in ADLs/IADLs  Current functional level: (P) Independent in ADLs/IADLs    PT AM-PAC:   /24  OT AM-PAC:   /24    Family can provide assistance at DC: (P) Yes  Would you like Case Management to discuss the discharge plan with any other family members/significant others, and if so, who? (P) No  Plans to Return to Present Housing: (P) Yes  Other Identified Issues/Barriers to RETURNING to current 
Visited with patient briefly to discuss discharge plan. States she is finally able to sleep since the phenergan is working. Will revisit tomorrow to assist with any discharge needs.  
8